# Patient Record
Sex: FEMALE | Race: WHITE | Employment: UNEMPLOYED
[De-identification: names, ages, dates, MRNs, and addresses within clinical notes are randomized per-mention and may not be internally consistent; named-entity substitution may affect disease eponyms.]

---

## 2018-03-22 ENCOUNTER — TELEPHONE (OUTPATIENT)
Dept: CASE MANAGEMENT | Age: 58
End: 2018-03-22

## 2018-03-22 NOTE — TELEPHONE ENCOUNTER
A lung screening packet was mailed to Claudette uLna on 3/22/2018. This packet includes: an appointment reminder for her CT Lung Screening scheduled for 3/27/18 , a patient questionnaire,a lung screening brochure and the [facility] Lung Screening Patient Education Sheet.       KATELIN Monae, RMarquisTMarquis(R)(T)  Patient [address] [facility]    [phone]

## 2018-03-27 ENCOUNTER — HOSPITAL ENCOUNTER (OUTPATIENT)
Dept: CT IMAGING | Age: 58
Discharge: HOME | End: 2018-03-29
Payer: OTHER GOVERNMENT

## 2018-03-27 DIAGNOSIS — Z87.891 PERSONAL HISTORY OF TOBACCO USE: ICD-10-CM

## 2018-03-27 PROCEDURE — G0297 LDCT FOR LUNG CA SCREEN: HCPCS

## 2018-03-28 ENCOUNTER — TELEPHONE (OUTPATIENT)
Dept: CASE MANAGEMENT | Age: 58
End: 2018-03-28

## 2018-03-28 NOTE — TELEPHONE ENCOUNTER
No call, encounter opened to process CT Lung Screening. CT Lung Screen 3/27/18 :  Impression   1. No focal consolidation, pleural effusion or pneumothorax. 2. No definite pleural or parenchymal mass lesion. 3. Minimal pulmonary emphysematous disease. 4. Enlarged precarinal lymph node of uncertain etiology and clinical   significance. Recommend clinical correlation. 5. Ectasia of ascending aorta, measuring 3.8 cm in maximum diameter. 6. Small to moderate size hiatal hernia.       Lung - RADS Category 1 :  Negative -  No nodules and definitely benign   nodules - Continue annual lung CT screening in 12 months. Dinorah Dumas is a  66 y.o. female who is a former (11 years) smoker with a 30 pack year smoking history. Letter mailed to patient  3/28/2018 encouraging her  to call her physician for results and follow up recommendations if needed.             AURELIO Tian., R.T.(R)(T)  Patient [address] [facility]    [phone]

## 2018-03-28 NOTE — LETTER
Medical Imaging Services      3/28/2018        [place] [address]            Dear Roger Morgan,       RE: Your recent CT Lung Screen                      We are pleased to inform you that your exam showed no signs of lung cancer. We recommend that your next lung screening exam be in 12 months. Here are some other important points you should know:      Your full low-dose Chest CT report, including any minor observations, has been sent to your health care provider. Your exam report and images will be kept on file at [facility]) as part of your permanent record and are available for your continuing care.  Although low-dose chest CT is very effective at finding lung cancer early, it cannot find all lung cancers. If you develop any new symptoms such as shortness of breath, chest pain, or coughing up blood, please call your doctor.  Please keep in mind that good health involves quitting smoking (for help, call [address] at [phone]), an annual physical exam, and continued screening with low-dose chest CT. If you have any questions about this letter or have difficulty in contacting your health care provider please call our patient navigator, Racheal Gracia at [phone].           Sincerely, The [facility] Lung Screening Program

## 2018-04-03 DIAGNOSIS — I77.89 ASCENDING AORTA ENLARGEMENT (HCC): Primary | ICD-10-CM

## 2018-04-17 ENCOUNTER — OFFICE VISIT (OUTPATIENT)
Dept: CARDIOTHORACIC SURGERY | Age: 58
End: 2018-04-17
Payer: OTHER GOVERNMENT

## 2018-04-17 VITALS
DIASTOLIC BLOOD PRESSURE: 88 MMHG | HEIGHT: 62 IN | SYSTOLIC BLOOD PRESSURE: 129 MMHG | BODY MASS INDEX: 29.81 KG/M2 | HEART RATE: 86 BPM | WEIGHT: 162 LBS

## 2018-04-17 DIAGNOSIS — R59.0 MEDIASTINAL LYMPHADENOPATHY: Primary | ICD-10-CM

## 2018-04-17 DIAGNOSIS — K44.9 HIATAL HERNIA: ICD-10-CM

## 2018-04-17 DIAGNOSIS — I77.810 ASCENDING AORTA DILATION (HCC): ICD-10-CM

## 2018-04-17 PROCEDURE — 99204 OFFICE O/P NEW MOD 45 MIN: CPT | Performed by: THORACIC SURGERY (CARDIOTHORACIC VASCULAR SURGERY)

## 2018-04-17 ASSESSMENT — ENCOUNTER SYMPTOMS
SHORTNESS OF BREATH: 0
COUGH: 0

## 2018-04-18 ENCOUNTER — OFFICE VISIT (OUTPATIENT)
Dept: FAMILY MEDICINE CLINIC | Age: 58
End: 2018-04-18
Payer: OTHER GOVERNMENT

## 2018-04-18 VITALS
RESPIRATION RATE: 18 BRPM | WEIGHT: 167 LBS | OXYGEN SATURATION: 97 % | SYSTOLIC BLOOD PRESSURE: 138 MMHG | HEIGHT: 63 IN | TEMPERATURE: 98 F | BODY MASS INDEX: 29.59 KG/M2 | DIASTOLIC BLOOD PRESSURE: 90 MMHG | HEART RATE: 92 BPM

## 2018-04-18 DIAGNOSIS — R59.0 MEDIASTINAL LYMPHADENOPATHY: ICD-10-CM

## 2018-04-18 DIAGNOSIS — I71.20 THORACIC AORTIC ANEURYSM WITHOUT RUPTURE: Primary | ICD-10-CM

## 2018-04-18 DIAGNOSIS — R59.9 LYMPH NODE ENLARGEMENT: ICD-10-CM

## 2018-04-18 DIAGNOSIS — K44.9 HERNIA, HIATAL: ICD-10-CM

## 2018-04-18 DIAGNOSIS — K44.9 HIATAL HERNIA: Primary | ICD-10-CM

## 2018-04-18 PROCEDURE — 99213 OFFICE O/P EST LOW 20 MIN: CPT | Performed by: FAMILY MEDICINE

## 2018-04-18 PROCEDURE — 99999 PR DOPPLER ECHO HEART,COMPLETE: CPT | Performed by: THORACIC SURGERY (CARDIOTHORACIC VASCULAR SURGERY)

## 2018-04-18 RX ORDER — RANITIDINE 150 MG/1
150 TABLET ORAL 2 TIMES DAILY
Qty: 60 TABLET | Refills: 3 | Status: SHIPPED | OUTPATIENT
Start: 2018-04-18 | End: 2018-05-25

## 2018-04-25 ENCOUNTER — TELEPHONE (OUTPATIENT)
Dept: CARDIOTHORACIC SURGERY | Age: 58
End: 2018-04-25

## 2018-05-11 ENCOUNTER — PREP FOR PROCEDURE (OUTPATIENT)
Dept: SURGERY | Age: 58
End: 2018-05-11

## 2018-05-11 RX ORDER — SODIUM CHLORIDE 9 MG/ML
INJECTION, SOLUTION INTRAVENOUS CONTINUOUS
Status: CANCELLED | OUTPATIENT
Start: 2018-05-11

## 2018-05-15 ENCOUNTER — TELEPHONE (OUTPATIENT)
Dept: CARDIOTHORACIC SURGERY | Age: 58
End: 2018-05-15

## 2018-05-15 ENCOUNTER — HOSPITAL ENCOUNTER (OUTPATIENT)
Dept: NON INVASIVE DIAGNOSTICS | Age: 58
Discharge: HOME | End: 2018-05-15
Payer: OTHER GOVERNMENT

## 2018-05-15 LAB
LV EF: 60 %
LVEF MODALITY: NORMAL

## 2018-05-15 PROCEDURE — 93306 TTE W/DOPPLER COMPLETE: CPT

## 2018-05-16 ENCOUNTER — TELEPHONE (OUTPATIENT)
Dept: FAMILY MEDICINE CLINIC | Age: 58
End: 2018-05-16

## 2018-05-25 RX ORDER — RANITIDINE 150 MG/1
150 TABLET ORAL 2 TIMES DAILY
COMMUNITY
End: 2018-11-29 | Stop reason: ALTCHOICE

## 2018-05-25 RX ORDER — FLUTICASONE PROPIONATE 50 MCG
1 SPRAY, SUSPENSION (ML) NASAL DAILY
COMMUNITY
End: 2019-03-26 | Stop reason: SDUPTHER

## 2018-05-30 ENCOUNTER — HOSPITAL ENCOUNTER (OUTPATIENT)
Dept: GENERAL RADIOLOGY | Age: 58
Discharge: HOME | End: 2018-06-01
Payer: OTHER GOVERNMENT

## 2018-05-30 DIAGNOSIS — K44.0 DIAPHRAGMATIC HERNIA WITH OBSTRUCTION, WITHOUT GANGRENE: ICD-10-CM

## 2018-05-30 PROCEDURE — A4641 RADIOPHARM DX AGENT NOC: HCPCS | Performed by: RADIOLOGY

## 2018-05-30 PROCEDURE — 74240 X-RAY XM UPR GI TRC 1CNTRST: CPT

## 2018-05-30 PROCEDURE — 6360000004 HC RX CONTRAST MEDICATION: Performed by: RADIOLOGY

## 2018-05-30 PROCEDURE — 2500000003 HC RX 250 WO HCPCS: Performed by: RADIOLOGY

## 2018-05-30 PROCEDURE — 6370000000 HC RX 637 (ALT 250 FOR IP): Performed by: RADIOLOGY

## 2018-05-30 RX ADMIN — ANTACID/ANTIFLATULENT 1 EACH: 380; 550; 10; 10 GRANULE, EFFERVESCENT ORAL at 08:56

## 2018-05-30 RX ADMIN — BARIUM SULFATE 1 TABLET: 700 TABLET ORAL at 08:55

## 2018-05-30 RX ADMIN — BARIUM SULFATE 176 ML: 960 POWDER, FOR SUSPENSION ORAL at 08:56

## 2018-05-30 RX ADMIN — BARIUM SULFATE 140 ML: 980 POWDER, FOR SUSPENSION ORAL at 08:56

## 2018-06-11 ENCOUNTER — ANESTHESIA (OUTPATIENT)
Dept: ENDOSCOPY | Age: 58
End: 2018-06-11
Payer: OTHER GOVERNMENT

## 2018-06-11 ENCOUNTER — HOSPITAL ENCOUNTER (OUTPATIENT)
Age: 58
Setting detail: OUTPATIENT SURGERY
Discharge: HOME | End: 2018-06-11
Attending: SURGERY | Admitting: SURGERY
Payer: OTHER GOVERNMENT

## 2018-06-11 ENCOUNTER — ANESTHESIA EVENT (OUTPATIENT)
Dept: ENDOSCOPY | Age: 58
End: 2018-06-11
Payer: OTHER GOVERNMENT

## 2018-06-11 VITALS
WEIGHT: 162 LBS | BODY MASS INDEX: 28.7 KG/M2 | OXYGEN SATURATION: 99 % | TEMPERATURE: 97.5 F | SYSTOLIC BLOOD PRESSURE: 145 MMHG | DIASTOLIC BLOOD PRESSURE: 92 MMHG | HEIGHT: 63 IN | HEART RATE: 70 BPM | RESPIRATION RATE: 16 BRPM

## 2018-06-11 VITALS — DIASTOLIC BLOOD PRESSURE: 89 MMHG | OXYGEN SATURATION: 98 % | SYSTOLIC BLOOD PRESSURE: 131 MMHG

## 2018-06-11 PROCEDURE — 6360000002 HC RX W HCPCS: Performed by: NURSE ANESTHETIST, CERTIFIED REGISTERED

## 2018-06-11 PROCEDURE — 7100000011 HC PHASE II RECOVERY - ADDTL 15 MIN: Performed by: SURGERY

## 2018-06-11 PROCEDURE — 2580000003 HC RX 258: Performed by: SURGERY

## 2018-06-11 PROCEDURE — 7100000010 HC PHASE II RECOVERY - FIRST 15 MIN: Performed by: SURGERY

## 2018-06-11 PROCEDURE — 88305 TISSUE EXAM BY PATHOLOGIST: CPT

## 2018-06-11 PROCEDURE — 3700000000 HC ANESTHESIA ATTENDED CARE: Performed by: SURGERY

## 2018-06-11 PROCEDURE — 3609012400 HC EGD TRANSORAL BIOPSY SINGLE/MULTIPLE: Performed by: SURGERY

## 2018-06-11 RX ORDER — SODIUM CHLORIDE 9 MG/ML
INJECTION, SOLUTION INTRAVENOUS CONTINUOUS
Status: DISCONTINUED | OUTPATIENT
Start: 2018-06-11 | End: 2018-06-11 | Stop reason: HOSPADM

## 2018-06-11 RX ORDER — PROPOFOL 10 MG/ML
INJECTION, EMULSION INTRAVENOUS PRN
Status: DISCONTINUED | OUTPATIENT
Start: 2018-06-11 | End: 2018-06-11 | Stop reason: SDUPTHER

## 2018-06-11 RX ADMIN — SODIUM CHLORIDE: 9 INJECTION, SOLUTION INTRAVENOUS at 09:00

## 2018-06-11 RX ADMIN — PROPOFOL 130 MG: 10 INJECTION, EMULSION INTRAVENOUS at 09:08

## 2018-06-11 ASSESSMENT — PAIN DESCRIPTION - PAIN TYPE
TYPE: SURGICAL PAIN
TYPE: SURGICAL PAIN

## 2018-06-11 ASSESSMENT — PAIN SCALES - GENERAL
PAINLEVEL_OUTOF10: 0
PAINLEVEL_OUTOF10: 0

## 2018-06-11 ASSESSMENT — PAIN - FUNCTIONAL ASSESSMENT: PAIN_FUNCTIONAL_ASSESSMENT: 0-10

## 2018-09-05 ENCOUNTER — HOSPITAL ENCOUNTER (OUTPATIENT)
Age: 58
Discharge: HOME | End: 2018-09-07
Payer: OTHER GOVERNMENT

## 2018-09-05 ENCOUNTER — NURSE ONLY (OUTPATIENT)
Dept: FAMILY MEDICINE CLINIC | Age: 58
End: 2018-09-05
Payer: OTHER GOVERNMENT

## 2018-09-05 DIAGNOSIS — E78.5 HYPERLIPIDEMIA, UNSPECIFIED HYPERLIPIDEMIA TYPE: ICD-10-CM

## 2018-09-05 LAB
ALBUMIN SERPL-MCNC: 4.4 G/DL (ref 3.5–5.2)
ALP BLD-CCNC: 37 U/L (ref 35–104)
ALT SERPL-CCNC: 16 U/L (ref 0–32)
ANION GAP SERPL CALCULATED.3IONS-SCNC: 15 MMOL/L (ref 7–16)
AST SERPL-CCNC: 28 U/L (ref 0–31)
BILIRUB SERPL-MCNC: <0.2 MG/DL (ref 0–1.2)
BUN BLDV-MCNC: 17 MG/DL (ref 6–20)
CALCIUM SERPL-MCNC: 9 MG/DL (ref 8.6–10.2)
CHLORIDE BLD-SCNC: 102 MMOL/L (ref 98–107)
CHOLESTEROL, TOTAL: 253 MG/DL (ref 0–199)
CO2: 25 MMOL/L (ref 22–29)
CREAT SERPL-MCNC: 0.7 MG/DL (ref 0.5–1)
GFR AFRICAN AMERICAN: >60
GFR NON-AFRICAN AMERICAN: >60 ML/MIN/1.73
GLUCOSE BLD-MCNC: 99 MG/DL (ref 74–109)
HDLC SERPL-MCNC: 79 MG/DL
LDL CHOLESTEROL CALCULATED: 160 MG/DL (ref 0–99)
POTASSIUM SERPL-SCNC: 4.9 MMOL/L (ref 3.5–5)
SODIUM BLD-SCNC: 142 MMOL/L (ref 132–146)
TOTAL PROTEIN: 6.9 G/DL (ref 6.4–8.3)
TRIGL SERPL-MCNC: 68 MG/DL (ref 0–149)
VLDLC SERPL CALC-MCNC: 14 MG/DL

## 2018-09-05 PROCEDURE — 36415 COLL VENOUS BLD VENIPUNCTURE: CPT | Performed by: FAMILY MEDICINE

## 2018-09-05 PROCEDURE — 80061 LIPID PANEL: CPT

## 2018-09-05 PROCEDURE — 80053 COMPREHEN METABOLIC PANEL: CPT

## 2018-09-11 ENCOUNTER — OFFICE VISIT (OUTPATIENT)
Dept: FAMILY MEDICINE CLINIC | Age: 58
End: 2018-09-11
Payer: OTHER GOVERNMENT

## 2018-09-11 VITALS
BODY MASS INDEX: 28.03 KG/M2 | RESPIRATION RATE: 16 BRPM | SYSTOLIC BLOOD PRESSURE: 129 MMHG | HEIGHT: 63 IN | DIASTOLIC BLOOD PRESSURE: 81 MMHG | TEMPERATURE: 98.4 F | OXYGEN SATURATION: 99 % | WEIGHT: 158.2 LBS | HEART RATE: 90 BPM

## 2018-09-11 DIAGNOSIS — E66.3 PATIENT OVERWEIGHT: ICD-10-CM

## 2018-09-11 DIAGNOSIS — E55.9 VITAMIN D DEFICIENCY: ICD-10-CM

## 2018-09-11 DIAGNOSIS — E78.5 HYPERLIPIDEMIA, UNSPECIFIED HYPERLIPIDEMIA TYPE: Primary | ICD-10-CM

## 2018-09-11 DIAGNOSIS — J34.89 SINUS DRAINAGE: ICD-10-CM

## 2018-09-11 DIAGNOSIS — K44.9 HIATAL HERNIA: ICD-10-CM

## 2018-09-11 PROCEDURE — 99214 OFFICE O/P EST MOD 30 MIN: CPT | Performed by: FAMILY MEDICINE

## 2018-09-11 RX ORDER — OMEPRAZOLE 40 MG/1
40 CAPSULE, DELAYED RELEASE ORAL DAILY
COMMUNITY
End: 2018-11-29 | Stop reason: ALTCHOICE

## 2018-09-11 ASSESSMENT — PATIENT HEALTH QUESTIONNAIRE - PHQ9
1. LITTLE INTEREST OR PLEASURE IN DOING THINGS: 0
SUM OF ALL RESPONSES TO PHQ9 QUESTIONS 1 & 2: 0
2. FEELING DOWN, DEPRESSED OR HOPELESS: 0
SUM OF ALL RESPONSES TO PHQ QUESTIONS 1-9: 0
SUM OF ALL RESPONSES TO PHQ QUESTIONS 1-9: 0

## 2018-09-11 NOTE — PROGRESS NOTES
BP: 129/81   Pulse: 90   Resp: 16   Temp: 98.4 °F (36.9 °C)   TempSrc: Oral   SpO2: 99%   Weight: 158 lb 3.2 oz (71.8 kg)   Height: 5' 2.5\" (1.588 m)       General:  Patient alert and oriented x 3, NAD, pleasant  HEENT:  Atraumatic, normocephalic, PERRLA, EOMI, clear conjunctiva, TMs clear, nose-clear, throat - no erythema  Neck:  Supple, no goiter, no carotid bruits, no LAD  Lungs:  CTA B  Heart:  RRR, no murmurs, gallops or rubs  Abdomen:  Soft/nt/nd, + bowel sounds  Extremities:  No clubbing, cyanosis or edema  Skin: unremarkable    Assessment/Plan:      Megan Hernández was seen today for hyperlipidemia and discuss labs. Diagnoses and all orders for this visit:    Hyperlipidemia, unspecified hyperlipidemia type  -     Comprehensive Metabolic Panel; Future  -     CBC Auto Differential; Future  -     Lipid Panel; Future  -     TSH without Reflex; Future    Patient overweight    Sinus drainage    Hiatal hernia    Vitamin D deficiency  -     Vitamin D 25 Hydrox, D2 & D3; Future      As above. Call or go to ED immediately if symptoms worsen or persist.  Return in about 6 months (around 3/11/2019). , or sooner if necessary. Educational materials and/or home exercises printed for patient's review and were included in patient instructions on his/her After Visit Summary and given to patient at the end of visit. Counseled regarding above diagnosis, including possible risks and complications,  especially if left uncontrolled. Counseled regarding the possible side effects, risks, benefits and alternatives to treatment; patient and/or guardian verbalizes understanding, agrees, feels comfortable with and wishes to proceed with above treatment plan. Advised patient to call with any new medication issues, and read all Rx info from pharmacy to assure aware of all possible risks and side effects of medication before taking. Reviewed age and gender appropriate health screening exams and vaccinations.   Advised patient

## 2018-10-16 ENCOUNTER — NURSE ONLY (OUTPATIENT)
Dept: FAMILY MEDICINE CLINIC | Age: 58
End: 2018-10-16
Payer: OTHER GOVERNMENT

## 2018-10-16 DIAGNOSIS — Z23 NEED FOR INFLUENZA VACCINATION: Primary | ICD-10-CM

## 2018-10-16 PROCEDURE — 90686 IIV4 VACC NO PRSV 0.5 ML IM: CPT | Performed by: FAMILY MEDICINE

## 2018-10-16 PROCEDURE — 90471 IMMUNIZATION ADMIN: CPT | Performed by: FAMILY MEDICINE

## 2018-11-01 ENCOUNTER — HOSPITAL ENCOUNTER (OUTPATIENT)
Dept: CT IMAGING | Age: 58
Discharge: HOME | End: 2018-11-03
Payer: OTHER GOVERNMENT

## 2018-11-01 DIAGNOSIS — R59.0 MEDIASTINAL ADENOPATHY: ICD-10-CM

## 2018-11-01 PROCEDURE — 6360000004 HC RX CONTRAST MEDICATION: Performed by: RADIOLOGY

## 2018-11-01 PROCEDURE — 71260 CT THORAX DX C+: CPT

## 2018-11-01 RX ADMIN — IOPAMIDOL 80 ML: 755 INJECTION, SOLUTION INTRAVENOUS at 13:51

## 2019-03-15 ENCOUNTER — TELEPHONE (OUTPATIENT)
Dept: CASE MANAGEMENT | Age: 59
End: 2019-03-15

## 2019-03-21 ENCOUNTER — NURSE ONLY (OUTPATIENT)
Dept: FAMILY MEDICINE CLINIC | Age: 59
End: 2019-03-21
Payer: OTHER GOVERNMENT

## 2019-03-21 ENCOUNTER — HOSPITAL ENCOUNTER (OUTPATIENT)
Age: 59
Discharge: HOME | End: 2019-03-23
Payer: OTHER GOVERNMENT

## 2019-03-21 DIAGNOSIS — E78.5 HYPERLIPIDEMIA, UNSPECIFIED HYPERLIPIDEMIA TYPE: ICD-10-CM

## 2019-03-21 DIAGNOSIS — E55.9 VITAMIN D DEFICIENCY: ICD-10-CM

## 2019-03-21 PROCEDURE — 85025 COMPLETE CBC W/AUTO DIFF WBC: CPT

## 2019-03-21 PROCEDURE — 36415 COLL VENOUS BLD VENIPUNCTURE: CPT | Performed by: FAMILY MEDICINE

## 2019-03-21 PROCEDURE — 80061 LIPID PANEL: CPT

## 2019-03-21 PROCEDURE — 82306 VITAMIN D 25 HYDROXY: CPT

## 2019-03-21 PROCEDURE — 80053 COMPREHEN METABOLIC PANEL: CPT

## 2019-03-21 PROCEDURE — 84443 ASSAY THYROID STIM HORMONE: CPT

## 2019-03-22 LAB
ALBUMIN SERPL-MCNC: 4.6 G/DL (ref 3.5–5.2)
ALP BLD-CCNC: 81 U/L (ref 35–104)
ALT SERPL-CCNC: 16 U/L (ref 0–32)
ANION GAP SERPL CALCULATED.3IONS-SCNC: 14 MMOL/L (ref 7–16)
AST SERPL-CCNC: 24 U/L (ref 0–31)
BASOPHILS ABSOLUTE: 0.04 E9/L (ref 0–0.2)
BASOPHILS RELATIVE PERCENT: 0.9 % (ref 0–2)
BILIRUB SERPL-MCNC: 0.3 MG/DL (ref 0–1.2)
BUN BLDV-MCNC: 16 MG/DL (ref 6–20)
CALCIUM SERPL-MCNC: 9.5 MG/DL (ref 8.6–10.2)
CHLORIDE BLD-SCNC: 101 MMOL/L (ref 98–107)
CHOLESTEROL, TOTAL: 240 MG/DL (ref 0–199)
CO2: 26 MMOL/L (ref 22–29)
CREAT SERPL-MCNC: 0.8 MG/DL (ref 0.5–1)
EOSINOPHILS ABSOLUTE: 0.07 E9/L (ref 0.05–0.5)
EOSINOPHILS RELATIVE PERCENT: 1.5 % (ref 0–6)
GFR AFRICAN AMERICAN: >60
GFR NON-AFRICAN AMERICAN: >60 ML/MIN/1.73
GLUCOSE BLD-MCNC: 86 MG/DL (ref 74–99)
HCT VFR BLD CALC: 45.6 % (ref 34–48)
HDLC SERPL-MCNC: 88 MG/DL
HEMOGLOBIN: 14.4 G/DL (ref 11.5–15.5)
IMMATURE GRANULOCYTES #: 0 E9/L
IMMATURE GRANULOCYTES %: 0 % (ref 0–5)
LDL CHOLESTEROL CALCULATED: 139 MG/DL (ref 0–99)
LYMPHOCYTES ABSOLUTE: 2.52 E9/L (ref 1.5–4)
LYMPHOCYTES RELATIVE PERCENT: 55.3 % (ref 20–42)
MCH RBC QN AUTO: 30.8 PG (ref 26–35)
MCHC RBC AUTO-ENTMCNC: 31.6 % (ref 32–34.5)
MCV RBC AUTO: 97.6 FL (ref 80–99.9)
MONOCYTES ABSOLUTE: 0.29 E9/L (ref 0.1–0.95)
MONOCYTES RELATIVE PERCENT: 6.4 % (ref 2–12)
NEUTROPHILS ABSOLUTE: 1.64 E9/L (ref 1.8–7.3)
NEUTROPHILS RELATIVE PERCENT: 35.9 % (ref 43–80)
PDW BLD-RTO: 12.6 FL (ref 11.5–15)
PLATELET # BLD: 256 E9/L (ref 130–450)
PMV BLD AUTO: 11.1 FL (ref 7–12)
POTASSIUM SERPL-SCNC: 5.1 MMOL/L (ref 3.5–5)
RBC # BLD: 4.67 E12/L (ref 3.5–5.5)
SODIUM BLD-SCNC: 141 MMOL/L (ref 132–146)
TOTAL PROTEIN: 7.1 G/DL (ref 6.4–8.3)
TRIGL SERPL-MCNC: 66 MG/DL (ref 0–149)
TSH SERPL DL<=0.05 MIU/L-ACNC: 3.54 UIU/ML (ref 0.27–4.2)
VITAMIN D 25-HYDROXY: 41 NG/ML (ref 30–100)
VLDLC SERPL CALC-MCNC: 13 MG/DL
WBC # BLD: 4.6 E9/L (ref 4.5–11.5)

## 2019-03-26 ENCOUNTER — OFFICE VISIT (OUTPATIENT)
Dept: FAMILY MEDICINE CLINIC | Age: 59
End: 2019-03-26
Payer: OTHER GOVERNMENT

## 2019-03-26 VITALS
SYSTOLIC BLOOD PRESSURE: 128 MMHG | WEIGHT: 150.8 LBS | HEIGHT: 63 IN | BODY MASS INDEX: 26.72 KG/M2 | RESPIRATION RATE: 18 BRPM | OXYGEN SATURATION: 100 % | TEMPERATURE: 99.1 F | DIASTOLIC BLOOD PRESSURE: 87 MMHG | HEART RATE: 84 BPM

## 2019-03-26 DIAGNOSIS — E78.5 HYPERLIPIDEMIA, UNSPECIFIED HYPERLIPIDEMIA TYPE: ICD-10-CM

## 2019-03-26 DIAGNOSIS — Z23 NEED FOR SHINGLES VACCINE: ICD-10-CM

## 2019-03-26 DIAGNOSIS — Z00.00 ANNUAL PHYSICAL EXAM: Primary | ICD-10-CM

## 2019-03-26 PROCEDURE — 99396 PREV VISIT EST AGE 40-64: CPT | Performed by: FAMILY MEDICINE

## 2019-03-26 PROCEDURE — 90471 IMMUNIZATION ADMIN: CPT | Performed by: FAMILY MEDICINE

## 2019-03-26 PROCEDURE — 90750 HZV VACC RECOMBINANT IM: CPT | Performed by: FAMILY MEDICINE

## 2019-03-26 RX ORDER — FLUTICASONE PROPIONATE 50 MCG
1 SPRAY, SUSPENSION (ML) NASAL DAILY
Qty: 3 BOTTLE | Refills: 3 | Status: ON HOLD
Start: 2019-03-26 | End: 2021-03-29 | Stop reason: HOSPADM

## 2019-03-26 RX ORDER — MONTELUKAST SODIUM 10 MG/1
10 TABLET ORAL DAILY
Qty: 30 TABLET | Refills: 3 | Status: SHIPPED | OUTPATIENT
Start: 2019-03-26 | End: 2019-04-08 | Stop reason: SDUPTHER

## 2019-03-26 ASSESSMENT — PATIENT HEALTH QUESTIONNAIRE - PHQ9
2. FEELING DOWN, DEPRESSED OR HOPELESS: 0
SUM OF ALL RESPONSES TO PHQ QUESTIONS 1-9: 0
1. LITTLE INTEREST OR PLEASURE IN DOING THINGS: 0
SUM OF ALL RESPONSES TO PHQ QUESTIONS 1-9: 0
SUM OF ALL RESPONSES TO PHQ9 QUESTIONS 1 & 2: 0

## 2019-04-26 DIAGNOSIS — I71.20 THORACIC AORTIC ANEURYSM WITHOUT RUPTURE: Primary | ICD-10-CM

## 2019-06-12 ENCOUNTER — NURSE ONLY (OUTPATIENT)
Dept: FAMILY MEDICINE CLINIC | Age: 59
End: 2019-06-12
Payer: OTHER GOVERNMENT

## 2019-06-12 DIAGNOSIS — Z23 NEED FOR SHINGLES VACCINE: Primary | ICD-10-CM

## 2019-06-12 PROCEDURE — 90750 HZV VACC RECOMBINANT IM: CPT | Performed by: FAMILY MEDICINE

## 2019-06-12 PROCEDURE — 90471 IMMUNIZATION ADMIN: CPT | Performed by: FAMILY MEDICINE

## 2019-07-25 ENCOUNTER — HOSPITAL ENCOUNTER (OUTPATIENT)
Dept: CT IMAGING | Age: 59
Discharge: HOME | End: 2019-07-27
Payer: OTHER GOVERNMENT

## 2019-07-25 DIAGNOSIS — I71.20 THORACIC AORTIC ANEURYSM WITHOUT RUPTURE: ICD-10-CM

## 2019-07-25 PROCEDURE — 71260 CT THORAX DX C+: CPT

## 2019-07-25 PROCEDURE — 6360000004 HC RX CONTRAST MEDICATION: Performed by: RADIOLOGY

## 2019-07-25 RX ADMIN — IOPAMIDOL 90 ML: 755 INJECTION, SOLUTION INTRAVENOUS at 08:21

## 2019-07-30 ENCOUNTER — OFFICE VISIT (OUTPATIENT)
Dept: CARDIOTHORACIC SURGERY | Age: 59
End: 2019-07-30
Payer: OTHER GOVERNMENT

## 2019-07-30 VITALS
HEIGHT: 62 IN | BODY MASS INDEX: 28.34 KG/M2 | HEART RATE: 80 BPM | WEIGHT: 154 LBS | SYSTOLIC BLOOD PRESSURE: 131 MMHG | DIASTOLIC BLOOD PRESSURE: 86 MMHG

## 2019-07-30 DIAGNOSIS — Z09 FOLLOW UP: Primary | ICD-10-CM

## 2019-07-30 PROCEDURE — 99213 OFFICE O/P EST LOW 20 MIN: CPT | Performed by: THORACIC SURGERY (CARDIOTHORACIC VASCULAR SURGERY)

## 2019-07-30 NOTE — PROGRESS NOTES
Subjective:      Patient ID: Dequan Samaniego is a 64 y.o. female. Chief Complaint   Patient presents with    Follow-up     1 yr f/u thoracic aneurysm       HPI  Ms. Tova Arredondo is a 79-year-old female whom presents to the office for surveillance of a possible ascending aortic aneurysm. Recent CT of the chest reveals a stable aortic aneurysm  is not aneurysmal. She denies any SOB, CP, back pain, or any major complaints. She is not hypertensive, and is a former smoker. Review of Systems   Constitutional: Negative for fever and chills. Respiratory: Negative for cough, chest tightness and shortness of breath. Cardiovascular: Negative for chest pain, palpitations and leg swelling. Musculoskeletal: Negative for back pain. Neurological: Negative for dizziness, syncope and light-headedness. Objective:   Physical Exam  Constitutional: oriented to person, place, and time. No distress. Cardiovascular: Normal rate. Pulmonary/Chest: Effort normal and breath sounds normal. No respiratory distress. Abdominal: Soft. Bowel sounds are normal.   Musculoskeletal: Normal range of motion. Neurological: alert and oriented to person, place, and time. Skin: Skin is warm and dry. Psychiatric: normal mood and affect. Assessment:      3.4 cm Ascending aorta      Plan: This CT scan is more appropriate to evaluate her ascending aorta and it is not aneurysmal.  Her echo shows a trileaflet valve. I do not feel there is any reason to continue to do CT scans on her from an aorta standpoint. Follow up PRN.         Olga Garcia MD

## 2019-09-19 ENCOUNTER — HOSPITAL ENCOUNTER (OUTPATIENT)
Age: 59
Discharge: HOME | End: 2019-09-21
Payer: OTHER GOVERNMENT

## 2019-09-19 ENCOUNTER — NURSE ONLY (OUTPATIENT)
Dept: FAMILY MEDICINE CLINIC | Age: 59
End: 2019-09-19
Payer: OTHER GOVERNMENT

## 2019-09-19 DIAGNOSIS — E78.5 HYPERLIPIDEMIA, UNSPECIFIED HYPERLIPIDEMIA TYPE: ICD-10-CM

## 2019-09-19 PROCEDURE — 80061 LIPID PANEL: CPT

## 2019-09-19 PROCEDURE — 36415 COLL VENOUS BLD VENIPUNCTURE: CPT | Performed by: FAMILY MEDICINE

## 2019-09-19 PROCEDURE — 80053 COMPREHEN METABOLIC PANEL: CPT

## 2019-09-19 PROCEDURE — 85025 COMPLETE CBC W/AUTO DIFF WBC: CPT

## 2019-09-20 LAB
ALBUMIN SERPL-MCNC: 4.4 G/DL (ref 3.5–5.2)
ALP BLD-CCNC: 68 U/L (ref 35–104)
ALT SERPL-CCNC: 14 U/L (ref 0–32)
ANION GAP SERPL CALCULATED.3IONS-SCNC: 15 MMOL/L (ref 7–16)
AST SERPL-CCNC: 22 U/L (ref 0–31)
BASOPHILS ABSOLUTE: 0.03 E9/L (ref 0–0.2)
BASOPHILS RELATIVE PERCENT: 0.6 % (ref 0–2)
BILIRUB SERPL-MCNC: 0.3 MG/DL (ref 0–1.2)
BUN BLDV-MCNC: 10 MG/DL (ref 6–20)
CALCIUM SERPL-MCNC: 9.5 MG/DL (ref 8.6–10.2)
CHLORIDE BLD-SCNC: 103 MMOL/L (ref 98–107)
CHOLESTEROL, TOTAL: 262 MG/DL (ref 0–199)
CO2: 25 MMOL/L (ref 22–29)
CREAT SERPL-MCNC: 0.8 MG/DL (ref 0.5–1)
EOSINOPHILS ABSOLUTE: 0.07 E9/L (ref 0.05–0.5)
EOSINOPHILS RELATIVE PERCENT: 1.5 % (ref 0–6)
GFR AFRICAN AMERICAN: >60
GFR NON-AFRICAN AMERICAN: >60 ML/MIN/1.73
GLUCOSE BLD-MCNC: 84 MG/DL (ref 74–99)
HCT VFR BLD CALC: 46.9 % (ref 34–48)
HDLC SERPL-MCNC: 98 MG/DL
HEMOGLOBIN: 14.7 G/DL (ref 11.5–15.5)
IMMATURE GRANULOCYTES #: 0.01 E9/L
IMMATURE GRANULOCYTES %: 0.2 % (ref 0–5)
LDL CHOLESTEROL CALCULATED: 155 MG/DL (ref 0–99)
LYMPHOCYTES ABSOLUTE: 2.8 E9/L (ref 1.5–4)
LYMPHOCYTES RELATIVE PERCENT: 60.5 % (ref 20–42)
MCH RBC QN AUTO: 30.6 PG (ref 26–35)
MCHC RBC AUTO-ENTMCNC: 31.3 % (ref 32–34.5)
MCV RBC AUTO: 97.7 FL (ref 80–99.9)
MONOCYTES ABSOLUTE: 0.35 E9/L (ref 0.1–0.95)
MONOCYTES RELATIVE PERCENT: 7.6 % (ref 2–12)
NEUTROPHILS ABSOLUTE: 1.37 E9/L (ref 1.8–7.3)
NEUTROPHILS RELATIVE PERCENT: 29.6 % (ref 43–80)
PDW BLD-RTO: 13 FL (ref 11.5–15)
PLATELET # BLD: 259 E9/L (ref 130–450)
PMV BLD AUTO: 10.9 FL (ref 7–12)
POTASSIUM SERPL-SCNC: 4.4 MMOL/L (ref 3.5–5)
RBC # BLD: 4.8 E12/L (ref 3.5–5.5)
SODIUM BLD-SCNC: 143 MMOL/L (ref 132–146)
TOTAL PROTEIN: 7.6 G/DL (ref 6.4–8.3)
TRIGL SERPL-MCNC: 45 MG/DL (ref 0–149)
VLDLC SERPL CALC-MCNC: 9 MG/DL
WBC # BLD: 4.6 E9/L (ref 4.5–11.5)

## 2019-10-29 ENCOUNTER — OFFICE VISIT (OUTPATIENT)
Dept: FAMILY MEDICINE CLINIC | Age: 59
End: 2019-10-29
Payer: OTHER GOVERNMENT

## 2019-10-29 VITALS
BODY MASS INDEX: 28.01 KG/M2 | DIASTOLIC BLOOD PRESSURE: 88 MMHG | RESPIRATION RATE: 16 BRPM | WEIGHT: 152.2 LBS | TEMPERATURE: 98.8 F | HEIGHT: 62 IN | SYSTOLIC BLOOD PRESSURE: 136 MMHG | OXYGEN SATURATION: 99 % | HEART RATE: 91 BPM

## 2019-10-29 DIAGNOSIS — E78.5 HYPERLIPIDEMIA, UNSPECIFIED HYPERLIPIDEMIA TYPE: Primary | ICD-10-CM

## 2019-10-29 DIAGNOSIS — E66.3 PATIENT OVERWEIGHT: ICD-10-CM

## 2019-10-29 DIAGNOSIS — Z23 NEED FOR INFLUENZA VACCINATION: ICD-10-CM

## 2019-10-29 PROCEDURE — 90686 IIV4 VACC NO PRSV 0.5 ML IM: CPT | Performed by: FAMILY MEDICINE

## 2019-10-29 PROCEDURE — 90471 IMMUNIZATION ADMIN: CPT | Performed by: FAMILY MEDICINE

## 2019-10-29 PROCEDURE — 99214 OFFICE O/P EST MOD 30 MIN: CPT | Performed by: FAMILY MEDICINE

## 2020-01-07 ENCOUNTER — PATIENT MESSAGE (OUTPATIENT)
Dept: FAMILY MEDICINE CLINIC | Age: 60
End: 2020-01-07

## 2020-01-07 RX ORDER — OMEPRAZOLE 40 MG/1
40 CAPSULE, DELAYED RELEASE ORAL DAILY
Qty: 90 CAPSULE | Refills: 3 | Status: SHIPPED
Start: 2020-01-07 | End: 2021-01-20

## 2020-01-07 NOTE — TELEPHONE ENCOUNTER
From: Rosa Mejía  To: Junior Dorian MD  Sent: 1/7/2020 11:42 AM EST  Subject: Prescription Question    Hello, I was trying to refill Omeprazole 40mg, it says it is unavailable for refill on mychart. If you are able to refill please do so with [facility]. Thank you for your time.   Rosa Mejía

## 2020-06-16 RX ORDER — MONTELUKAST SODIUM 10 MG/1
10 TABLET ORAL DAILY
Qty: 90 TABLET | Refills: 3 | Status: SHIPPED
Start: 2020-06-16 | End: 2021-03-22 | Stop reason: ALTCHOICE

## 2020-06-25 ENCOUNTER — HOSPITAL ENCOUNTER (OUTPATIENT)
Age: 60
Discharge: HOME | End: 2020-06-25
Payer: OTHER GOVERNMENT

## 2020-06-25 LAB
ALBUMIN SERPL-MCNC: 4.5 G/DL (ref 3.5–5.2)
ALP BLD-CCNC: 87 U/L (ref 35–104)
ALT SERPL-CCNC: 15 U/L (ref 0–32)
ANION GAP SERPL CALCULATED.3IONS-SCNC: 8 MMOL/L (ref 7–16)
AST SERPL-CCNC: 18 U/L (ref 0–31)
BILIRUB SERPL-MCNC: 0.4 MG/DL (ref 0–1.2)
BUN BLDV-MCNC: 14 MG/DL (ref 6–20)
CALCIUM SERPL-MCNC: 9 MG/DL (ref 8.6–10.2)
CHLORIDE BLD-SCNC: 105 MMOL/L (ref 98–107)
CHOLESTEROL, TOTAL: 219 MG/DL (ref 0–199)
CO2: 27 MMOL/L (ref 22–29)
CREAT SERPL-MCNC: 0.7 MG/DL (ref 0.5–1)
GFR AFRICAN AMERICAN: >60
GFR NON-AFRICAN AMERICAN: >60 ML/MIN/1.73
GLUCOSE BLD-MCNC: 96 MG/DL (ref 74–99)
HDLC SERPL-MCNC: 87 MG/DL
LDL CHOLESTEROL CALCULATED: 117 MG/DL (ref 0–99)
POTASSIUM SERPL-SCNC: 3.8 MMOL/L (ref 3.5–5)
SODIUM BLD-SCNC: 140 MMOL/L (ref 132–146)
TOTAL PROTEIN: 7.1 G/DL (ref 6.4–8.3)
TRIGL SERPL-MCNC: 74 MG/DL (ref 0–149)
VLDLC SERPL CALC-MCNC: 15 MG/DL

## 2020-06-25 PROCEDURE — 80053 COMPREHEN METABOLIC PANEL: CPT

## 2020-06-25 PROCEDURE — 80061 LIPID PANEL: CPT

## 2020-06-25 PROCEDURE — 36415 COLL VENOUS BLD VENIPUNCTURE: CPT

## 2020-06-30 ENCOUNTER — TELEMEDICINE (OUTPATIENT)
Dept: FAMILY MEDICINE CLINIC | Age: 60
End: 2020-06-30
Payer: OTHER GOVERNMENT

## 2020-06-30 PROCEDURE — 99214 OFFICE O/P EST MOD 30 MIN: CPT | Performed by: FAMILY MEDICINE

## 2020-06-30 SDOH — ECONOMIC STABILITY: TRANSPORTATION INSECURITY
IN THE PAST 12 MONTHS, HAS LACK OF TRANSPORTATION KEPT YOU FROM MEETINGS, WORK, OR FROM GETTING THINGS NEEDED FOR DAILY LIVING?: NO

## 2020-06-30 SDOH — ECONOMIC STABILITY: TRANSPORTATION INSECURITY
IN THE PAST 12 MONTHS, HAS THE LACK OF TRANSPORTATION KEPT YOU FROM MEDICAL APPOINTMENTS OR FROM GETTING MEDICATIONS?: NO

## 2020-06-30 SDOH — ECONOMIC STABILITY: FOOD INSECURITY: WITHIN THE PAST 12 MONTHS, THE FOOD YOU BOUGHT JUST DIDN'T LAST AND YOU DIDN'T HAVE MONEY TO GET MORE.: NEVER TRUE

## 2020-06-30 SDOH — ECONOMIC STABILITY: INCOME INSECURITY: HOW HARD IS IT FOR YOU TO PAY FOR THE VERY BASICS LIKE FOOD, HOUSING, MEDICAL CARE, AND HEATING?: NOT HARD AT ALL

## 2020-06-30 SDOH — ECONOMIC STABILITY: FOOD INSECURITY: WITHIN THE PAST 12 MONTHS, YOU WORRIED THAT YOUR FOOD WOULD RUN OUT BEFORE YOU GOT MONEY TO BUY MORE.: NEVER TRUE

## 2020-06-30 ASSESSMENT — PATIENT HEALTH QUESTIONNAIRE - PHQ9
1. LITTLE INTEREST OR PLEASURE IN DOING THINGS: 0
SUM OF ALL RESPONSES TO PHQ QUESTIONS 1-9: 0
2. FEELING DOWN, DEPRESSED OR HOPELESS: 0
SUM OF ALL RESPONSES TO PHQ9 QUESTIONS 1 & 2: 0
SUM OF ALL RESPONSES TO PHQ QUESTIONS 1-9: 0

## 2020-07-08 ENCOUNTER — OFFICE VISIT (OUTPATIENT)
Dept: ENT CLINIC | Age: 60
End: 2020-07-08
Payer: OTHER GOVERNMENT

## 2020-07-08 VITALS — BODY MASS INDEX: 28 KG/M2 | TEMPERATURE: 98.1 F | HEIGHT: 63 IN | WEIGHT: 158 LBS

## 2020-07-08 PROCEDURE — 99204 OFFICE O/P NEW MOD 45 MIN: CPT | Performed by: OTOLARYNGOLOGY

## 2020-07-08 RX ORDER — PSEUDOEPHEDRINE HYDROCHLORIDE 30 MG/1
30 TABLET ORAL EVERY 4 HOURS PRN
Status: ON HOLD | COMMUNITY
End: 2021-03-29 | Stop reason: HOSPADM

## 2020-07-08 RX ORDER — AZELASTINE 1 MG/ML
1 SPRAY, METERED NASAL 2 TIMES DAILY
Qty: 3 BOTTLE | Refills: 3 | Status: SHIPPED
Start: 2020-07-08 | End: 2021-02-16

## 2020-07-08 SDOH — HEALTH STABILITY: MENTAL HEALTH: HOW MANY STANDARD DRINKS CONTAINING ALCOHOL DO YOU HAVE ON A TYPICAL DAY?: 1 OR 2

## 2020-07-08 SDOH — HEALTH STABILITY: MENTAL HEALTH: HOW OFTEN DO YOU HAVE A DRINK CONTAINING ALCOHOL?: 4 OR MORE TIMES A WEEK

## 2020-07-08 ASSESSMENT — ENCOUNTER SYMPTOMS
GASTROINTESTINAL NEGATIVE: 1
SINUS PRESSURE: 1
ABDOMINAL PAIN: 0
RESPIRATORY NEGATIVE: 1
RHINORRHEA: 1
COLOR CHANGE: 0
SHORTNESS OF BREATH: 0
EYES NEGATIVE: 1

## 2020-07-08 NOTE — PROGRESS NOTES
Subjective:      Patient ID:  Christa Rodriguez is a 64 y.o. female. HPI:    Patient presents today for sinsus pressure and drainage . Condition has been present for 1 month(s). Pt had no headaches prior to this, patient also having balance issues. She is on flonase and still having issues. Also on singulare daily  Pt has been on shots 20 years ago.      Past Medical History:   Diagnosis Date    Ascending aorta enlargement (HCC)     Difficulty swallowing     Endometriosis     Hiatal hernia     Normal cardiac stress test 2012    Osteoporosis      Past Surgical History:   Procedure Laterality Date    COLONOSCOPY  2013    Dr Sneha Tapia  2007    Dr. Seng Silverman  10/1995    MYOMECTOMY  03/1996    SHOULDER SURGERY Right 07/2002    SHOULDER SURGERY Left 09/2002    UPPER GASTROINTESTINAL ENDOSCOPY N/A 6/11/2018    EGD BIOPSY performed by Emerald Starks MD [facility]     Family History   Problem Relation Age of Onset    Diabetes Mother     High Blood Pressure Mother     Other Mother         brain tumor    Heart Disease Father 48        CABG x 4 (twice)    Cancer Father 66        esophageal    Diabetes Father     High Cholesterol Father     Other Sister 54        MS    High Cholesterol Sister     Osteoporosis Sister     High Cholesterol Sister     Mult Sclerosis Sister     Osteoporosis Sister     Other Other         autoimmune disease     Social History     Socioeconomic History    Marital status:      Spouse name: None    Number of children: None    Years of education: None    Highest education level: None   Occupational History    Occupation: unemployed- OFFICE   Social Needs    Financial resource strain: Not hard at all   [place] insecurity     Worry: Never true     Inability: Never true    Transportation needs     Medical: No     Non-medical: No   Tobacco Use    Smoking status: Former Smoker     Packs/day: 1.00     Years: 30.00     Pack years: 30.00 Types: Cigarettes     Last attempt to quit: 10/10/2007     Years since quittin.7    Smokeless tobacco: Never Used   Substance and Sexual Activity    Alcohol use: Yes     Frequency: 4 or more times a week     Drinks per session: 1 or 2     Binge frequency: Daily or almost daily     Comment: 2 or 3 beer or wine weekly    Drug use: No    Sexual activity: Yes     Partners: Male   Lifestyle    Physical activity     Days per week: None     Minutes per session: None    Stress: None   Relationships    Social connections     Talks on phone: None     Gets together: None     Attends [employer] service: None     Active member of club or organization: None     Attends meetings of clubs or organizations: None     Relationship status: None    Intimate partner violence     Fear of current or ex partner: None     Emotionally abused: None     Physically abused: None     Forced sexual activity: None   Other Topics Concern    None   Social History Narrative    None     Allergies   Allergen Reactions    Benadryl [Diphenhydramine] Anaphylaxis    Minocycline Nausea Only     dizziness       Review of Systems   Constitutional: Negative. Negative for fever. HENT: Positive for congestion, postnasal drip, rhinorrhea, sinus pressure and sneezing. Eyes: Negative. Negative for visual disturbance. Respiratory: Negative. Negative for shortness of breath. Cardiovascular: Negative. Negative for chest pain. Gastrointestinal: Negative. Negative for abdominal pain. Genitourinary: Negative. Musculoskeletal: Negative. Skin: Negative. Negative for color change. Allergic/Immunologic: Positive for environmental allergies. Neurological: Negative. Psychiatric/Behavioral: Negative. Negative for behavioral problems and hallucinations. All other systems reviewed and are negative.               Objective:     Vitals:    20 1530   Temp: 98.1 °F (36.7 °C)     Physical Exam  Vitals signs and nursing note reviewed. Constitutional:       Appearance: She is well-developed. HENT:      Head: Normocephalic and atraumatic. Right Ear: Hearing, tympanic membrane, ear canal and external ear normal.      Left Ear: Hearing, tympanic membrane, ear canal and external ear normal.      Nose: Mucosal edema and rhinorrhea present. Mouth/Throat:      Pharynx: Uvula midline. Eyes:      Conjunctiva/sclera: Conjunctivae normal.      Pupils: Pupils are equal, round, and reactive to light. Neck:      Musculoskeletal: Normal range of motion and neck supple. Cardiovascular:      Rate and Rhythm: Normal rate and regular rhythm. Heart sounds: Normal heart sounds. Pulmonary:      Effort: Pulmonary effort is normal.      Breath sounds: Normal breath sounds. Abdominal:      General: Bowel sounds are normal.      Palpations: Abdomen is soft. Skin:     General: Skin is warm and dry. Neurological:      Mental Status: She is alert and oriented to person, place, and time. Assessment:       Diagnosis Orders   1. Facial pain     2. Seasonal allergic rhinitis due to pollen     3. Chronic pansinusitis                Plan:      Allergic rhinitis  I do want to continue fluticasone (Flonase) Astelin for now. Call or return to clinic prn if these symptoms worsen or fail to improve as anticipated.     Get CT for sinus anatomy    Follow up in 1 month(s)

## 2020-07-15 ENCOUNTER — TELEPHONE (OUTPATIENT)
Dept: ENT CLINIC | Age: 60
End: 2020-07-15

## 2020-07-15 NOTE — TELEPHONE ENCOUNTER
Spoke with Patient 7/13 she had not received flonase yet. Ben Espinoza should receive 7/14 but f/u is 8/5 and will only put her on it for 3-3.5wk, patient wants to know if okay with Dr. Harjinder Carr. Spoke with Dr Harjinder Carr 7/14 and he said that is fine.     Patient verbally notified 7/15

## 2020-07-30 ENCOUNTER — HOSPITAL ENCOUNTER (OUTPATIENT)
Dept: CT IMAGING | Age: 60
Discharge: HOME | End: 2020-08-01
Payer: OTHER GOVERNMENT

## 2020-07-30 PROCEDURE — 70486 CT MAXILLOFACIAL W/O DYE: CPT

## 2020-08-05 ENCOUNTER — OFFICE VISIT (OUTPATIENT)
Dept: ENT CLINIC | Age: 60
End: 2020-08-05
Payer: OTHER GOVERNMENT

## 2020-08-05 VITALS — WEIGHT: 168 LBS | BODY MASS INDEX: 29.77 KG/M2 | HEIGHT: 63 IN | TEMPERATURE: 97.3 F

## 2020-08-05 PROCEDURE — 99213 OFFICE O/P EST LOW 20 MIN: CPT | Performed by: OTOLARYNGOLOGY

## 2020-08-05 ASSESSMENT — ENCOUNTER SYMPTOMS
ALLERGIC/IMMUNOLOGIC NEGATIVE: 1
SINUS PAIN: 1
SHORTNESS OF BREATH: 0
VOMITING: 0
EYE DISCHARGE: 0
COLOR CHANGE: 0
SINUS PRESSURE: 1
FACIAL SWELLING: 0
STRIDOR: 0
COUGH: 0
NAUSEA: 0
EYE REDNESS: 0

## 2020-08-05 NOTE — PROGRESS NOTES
Subjective:     Patient ID:  Twylla Severe is a 64 y.o. female. HPI:    Pt returns for review of CT Sinus. There are not changes since last visit. She notes continued facial pain/pressure, daily, constant, relieved by nothing. Pt has beenon fluticasone (Flonase) for 12 month(s) and is doing adequately  Pt notes allergic rhinitis that was treated with 20 years ago with allergy shots she notes her pain/pressure and symptoms resolved       Patient'smedications, allergies, past medical, surgical, social and family histories werereviewed and updated as appropriate. Review of Systems   Constitutional: Negative for chills, fatigue and fever. HENT: Positive for sinus pressure and sinus pain. Negative for ear discharge, ear pain and facial swelling. Eyes: Negative for discharge and redness. Respiratory: Negative for cough, shortness of breath and stridor. Gastrointestinal: Negative for nausea and vomiting. Endocrine: Negative. Genitourinary: Negative. Musculoskeletal: Negative. Skin: Negative for color change and rash. Allergic/Immunologic: Negative. Neurological: Positive for dizziness. Negative for speech difficulty and headaches. Hematological: Negative. Psychiatric/Behavioral: Negative for agitation and confusion. All other systems reviewed and are negative. Objective:   Physical Exam  Constitutional:       Appearance: Normal appearance. She is normal weight. HENT:      Head: Normocephalic and atraumatic. Right Ear: Tympanic membrane, ear canal and external ear normal.      Left Ear: Tympanic membrane and external ear normal.      Nose: Nose normal.      Mouth/Throat:      Mouth: Mucous membranes are moist.   Eyes:      Pupils: Pupils are equal, round, and reactive to light. Neck:      Musculoskeletal: Normal range of motion. Cardiovascular:      Rate and Rhythm: Normal rate.    Pulmonary:      Effort: Pulmonary effort is normal.   Musculoskeletal: Normal range of motion. Skin:     General: Skin is warm and dry. Neurological:      General: No focal deficit present. Mental Status: She is alert and oriented to person, place, and time. CT sinuses (my review)    no - hallercells -   yes - eliseo bullosa - right moderate  yes - Enlarged inferior turbinates - bilateral   no- Obstructed OMC -  no - maxillary sinus disease   no- frontal sinus disease -  Left frontal sinus   Absent -  no   Size - Moderate  Right frontal sinus   Absent-  no   Size - Moderate  yes - Deviated nasal septum - left,  moderate  yes - Septal Spur - left,  moderate   no - posterior accessory os -                  Assessment:       Diagnosis Orders   1. Seasonal allergic rhinitis due to pollen  [facility]   2. Facial pain  [facility]              Plan:      Recommend allergy eval for possible immunotherapy   Pt a surgical candidate, she would like to try immunotherapy first and will recheck in 6 months  Follow up in 6 month(s)                         Yaw Lucero  1960    I have discussed the case, including pertinent history and exam findings with the resident. I have seen and examined the patient and the key elements of the encounter have been performed by me. I agree with the assessment, plan and orders as documented by the  resident              Remainder of medical problems as per  resident note. Patient seen and examined. Agree with above exam, assessment and plan.       Electronically signed by Burt Rojo DO on 8/11/20 at 11:55 AM EDT

## 2020-08-25 ENCOUNTER — TELEPHONE (OUTPATIENT)
Dept: ENT CLINIC | Age: 60
End: 2020-08-25

## 2020-10-20 ENCOUNTER — NURSE ONLY (OUTPATIENT)
Dept: FAMILY MEDICINE CLINIC | Age: 60
End: 2020-10-20
Payer: OTHER GOVERNMENT

## 2020-10-20 PROCEDURE — 90686 IIV4 VACC NO PRSV 0.5 ML IM: CPT | Performed by: FAMILY MEDICINE

## 2020-10-20 PROCEDURE — 90471 IMMUNIZATION ADMIN: CPT | Performed by: FAMILY MEDICINE

## 2020-12-08 ENCOUNTER — TELEPHONE (OUTPATIENT)
Dept: ADMINISTRATIVE | Age: 60
End: 2020-12-08

## 2020-12-08 NOTE — TELEPHONE ENCOUNTER
Called and cancelled patients appointment on 12/22 for her 6 mo. She did not reschedule at this time as she would like to talk to Dr. Sakshi Sweeney regarding a possible surgery with ENT in February. She will explain all details at call. Please contact patient.

## 2020-12-16 ENCOUNTER — TELEPHONE (OUTPATIENT)
Dept: ENT CLINIC | Age: 60
End: 2020-12-16

## 2020-12-17 ENCOUNTER — NURSE ONLY (OUTPATIENT)
Dept: FAMILY MEDICINE CLINIC | Age: 60
End: 2020-12-17
Payer: OTHER GOVERNMENT

## 2020-12-17 DIAGNOSIS — E78.5 HYPERLIPIDEMIA, UNSPECIFIED HYPERLIPIDEMIA TYPE: ICD-10-CM

## 2020-12-17 LAB
ALBUMIN SERPL-MCNC: 4.5 G/DL (ref 3.5–5.2)
ALP BLD-CCNC: 79 U/L (ref 35–104)
ALT SERPL-CCNC: 13 U/L (ref 0–32)
ANION GAP SERPL CALCULATED.3IONS-SCNC: 13 MMOL/L (ref 7–16)
AST SERPL-CCNC: 22 U/L (ref 0–31)
BASOPHILS ABSOLUTE: 0.04 E9/L (ref 0–0.2)
BASOPHILS RELATIVE PERCENT: 0.9 % (ref 0–2)
BILIRUB SERPL-MCNC: 0.3 MG/DL (ref 0–1.2)
BUN BLDV-MCNC: 11 MG/DL (ref 6–20)
CALCIUM SERPL-MCNC: 9.8 MG/DL (ref 8.6–10.2)
CHLORIDE BLD-SCNC: 102 MMOL/L (ref 98–107)
CHOLESTEROL, TOTAL: 224 MG/DL (ref 0–199)
CO2: 24 MMOL/L (ref 22–29)
CREAT SERPL-MCNC: 0.7 MG/DL (ref 0.5–1)
EOSINOPHILS ABSOLUTE: 0.07 E9/L (ref 0.05–0.5)
EOSINOPHILS RELATIVE PERCENT: 1.6 % (ref 0–6)
GFR AFRICAN AMERICAN: >60
GFR NON-AFRICAN AMERICAN: >60 ML/MIN/1.73
GLUCOSE BLD-MCNC: 94 MG/DL (ref 74–99)
HCT VFR BLD CALC: 45.8 % (ref 34–48)
HDLC SERPL-MCNC: 75 MG/DL
HEMOGLOBIN: 14.4 G/DL (ref 11.5–15.5)
IMMATURE GRANULOCYTES #: 0 E9/L
IMMATURE GRANULOCYTES %: 0 % (ref 0–5)
LDL CHOLESTEROL CALCULATED: 137 MG/DL (ref 0–99)
LYMPHOCYTES ABSOLUTE: 2.3 E9/L (ref 1.5–4)
LYMPHOCYTES RELATIVE PERCENT: 52.9 % (ref 20–42)
MCH RBC QN AUTO: 29.9 PG (ref 26–35)
MCHC RBC AUTO-ENTMCNC: 31.4 % (ref 32–34.5)
MCV RBC AUTO: 95 FL (ref 80–99.9)
MONOCYTES ABSOLUTE: 0.37 E9/L (ref 0.1–0.95)
MONOCYTES RELATIVE PERCENT: 8.5 % (ref 2–12)
NEUTROPHILS ABSOLUTE: 1.57 E9/L (ref 1.8–7.3)
NEUTROPHILS RELATIVE PERCENT: 36.1 % (ref 43–80)
PDW BLD-RTO: 12.9 FL (ref 11.5–15)
PLATELET # BLD: 255 E9/L (ref 130–450)
PMV BLD AUTO: 11.1 FL (ref 7–12)
POTASSIUM SERPL-SCNC: 5.5 MMOL/L (ref 3.5–5)
RBC # BLD: 4.82 E12/L (ref 3.5–5.5)
SODIUM BLD-SCNC: 139 MMOL/L (ref 132–146)
TOTAL PROTEIN: 7.1 G/DL (ref 6.4–8.3)
TRIGL SERPL-MCNC: 60 MG/DL (ref 0–149)
TSH SERPL DL<=0.05 MIU/L-ACNC: 2.62 UIU/ML (ref 0.27–4.2)
VLDLC SERPL CALC-MCNC: 12 MG/DL
WBC # BLD: 4.4 E9/L (ref 4.5–11.5)

## 2020-12-17 PROCEDURE — 36415 COLL VENOUS BLD VENIPUNCTURE: CPT | Performed by: FAMILY MEDICINE

## 2021-01-20 RX ORDER — OMEPRAZOLE 40 MG/1
CAPSULE, DELAYED RELEASE ORAL
Qty: 90 CAPSULE | Refills: 3 | Status: SHIPPED | OUTPATIENT
Start: 2021-01-20

## 2021-02-03 ENCOUNTER — NURSE ONLY (OUTPATIENT)
Dept: FAMILY MEDICINE CLINIC | Age: 61
End: 2021-02-03
Payer: OTHER GOVERNMENT

## 2021-02-03 DIAGNOSIS — R30.0 DYSURIA: Primary | ICD-10-CM

## 2021-02-03 DIAGNOSIS — R30.0 DYSURIA: ICD-10-CM

## 2021-02-03 LAB
BILIRUBIN, POC: NORMAL
BLOOD URINE, POC: NORMAL
CLARITY, POC: CLEAR
COLOR, POC: YELLOW
GLUCOSE URINE, POC: 100
KETONES, POC: NORMAL
LEUKOCYTE EST, POC: NORMAL
NITRITE, POC: POSITIVE
PH, POC: 6
PROTEIN, POC: 30
SPECIFIC GRAVITY, POC: 1.02
UROBILINOGEN, POC: 0.2

## 2021-02-03 PROCEDURE — 81002 URINALYSIS NONAUTO W/O SCOPE: CPT | Performed by: FAMILY MEDICINE

## 2021-02-03 RX ORDER — SULFAMETHOXAZOLE AND TRIMETHOPRIM 800; 160 MG/1; MG/1
1 TABLET ORAL 2 TIMES DAILY
Qty: 10 TABLET | Refills: 0 | Status: SHIPPED | OUTPATIENT
Start: 2021-02-03 | End: 2021-02-08

## 2021-02-05 LAB
ORGANISM: ABNORMAL
URINE CULTURE, ROUTINE: ABNORMAL

## 2021-02-10 ENCOUNTER — OFFICE VISIT (OUTPATIENT)
Dept: ENT CLINIC | Age: 61
End: 2021-02-10
Payer: OTHER GOVERNMENT

## 2021-02-10 VITALS — WEIGHT: 149 LBS | BODY MASS INDEX: 26.4 KG/M2 | HEIGHT: 63 IN | TEMPERATURE: 97.5 F | RESPIRATION RATE: 18 BRPM

## 2021-02-10 DIAGNOSIS — J30.1 SEASONAL ALLERGIC RHINITIS DUE TO POLLEN: Primary | ICD-10-CM

## 2021-02-10 DIAGNOSIS — J32.4 CHRONIC PANSINUSITIS: ICD-10-CM

## 2021-02-10 DIAGNOSIS — R51.9 FACIAL PAIN: ICD-10-CM

## 2021-02-10 PROCEDURE — 99213 OFFICE O/P EST LOW 20 MIN: CPT | Performed by: OTOLARYNGOLOGY

## 2021-02-10 ASSESSMENT — ENCOUNTER SYMPTOMS
COUGH: 0
FACIAL SWELLING: 0
COLOR CHANGE: 0
VOMITING: 0
NAUSEA: 0
EYE REDNESS: 0
ALLERGIC/IMMUNOLOGIC NEGATIVE: 1
STRIDOR: 0
EYE DISCHARGE: 0
SHORTNESS OF BREATH: 0
SINUS PRESSURE: 1
SINUS PAIN: 1

## 2021-02-10 NOTE — PROGRESS NOTES
Subjective:      Patient ID:  Tania Bell is a 64 y.o. female. HPI:  Pt returns for recheck of allergies. History of   no surgery  When?  year:     Nasal Steroid: yes   Name: fluticasone (Flonase)   Still taking: no   reason for stopping: taking prn    Other therapy:   Astelin- yes  oral antihistamine- none  leukotriene inhibitor- none  oral decongestant- none    which has been  not very effective     Pt has been on therapy for 6 month(s) and is doing poorly    Pt has seen allergy ad the shots were not covered. She was allergic to molds. The patient is complaining of nasal congestion and rhinorrhea    The patients worst time of year is fall and spring      Patient's medications, allergies, past medical, surgical, social and family histories were reviewed and updated as appropriate. Review of Systems   Constitutional: Negative for chills, fatigue and fever. HENT: Positive for sinus pressure and sinus pain. Negative for ear discharge, ear pain and facial swelling. Eyes: Negative for discharge and redness. Respiratory: Negative for cough, shortness of breath and stridor. Gastrointestinal: Negative for nausea and vomiting. Endocrine: Negative. Genitourinary: Negative. Musculoskeletal: Negative. Skin: Negative for color change and rash. Allergic/Immunologic: Negative. Neurological: Positive for dizziness. Negative for speech difficulty and headaches. Hematological: Negative. Psychiatric/Behavioral: Negative for agitation and confusion. All other systems reviewed and are negative. Objective:     Vitals:    02/10/21 1301   Resp: 18   Temp: 97.5 °F (36.4 °C)     Physical Exam  Constitutional:       Appearance: Normal appearance. She is normal weight. HENT:      Head: Normocephalic and atraumatic.       Right Ear: Tympanic membrane, ear canal and external ear normal.      Left Ear: Tympanic membrane and external ear normal.      Nose: Nose normal. Mouth/Throat:      Mouth: Mucous membranes are moist.   Eyes:      Pupils: Pupils are equal, round, and reactive to light. Neck:      Musculoskeletal: Normal range of motion. Cardiovascular:      Rate and Rhythm: Normal rate. Pulmonary:      Effort: Pulmonary effort is normal.   Musculoskeletal: Normal range of motion. Skin:     General: Skin is warm and dry. Neurological:      General: No focal deficit present. Mental Status: She is alert and oriented to person, place, and time. Assessment:       Diagnosis Orders   1. Seasonal allergic rhinitis due to pollen     2. Facial pain     3. Chronic pansinusitis                Plan:      I recommend:    Functional endoscopic sinus surgery with bilateral maxillary, frontal and sphenoid antrostomies, submucous resection of inferior turbinates, septoplasty    The procedure risks and benefits were discussed with the patient and family. Pt and family understood and decided to proceed with the surgery. Surgical risks include:    --Injury to the lining of the brain, meningitis, stroke and death - most common in the approach to the frontal sinus but may also occur in operations on the sphenoid and ethmoid sinuses  --Injury to the carotid artery - most common in sphenoid sinus surgery  --Injury to the orbit, eye and eye muscles. Most commonly the medial rectus muscle is injured, this can cause double vision. The optic nerve can be injured during sphenoid sinus surgery. Injury to the Nasolacrimal Duct can cause tearing. -- Bleeding or air in the orbit. If this happens, blindness can occur due to pressure. Relief of the pressure is mandatory to prevent loss of sight. Septoplasty  (nasal septum and turbinate surgery)       Surgical risks include:    -- Recurrence of the septal deviation with recurrence of the airway obstruction. The nasal septum is made of cartilage.  Cartilage has a memory and sometimes over the course of hours will bend back to the preoperative position. -- Septal Hematoma: This can occur if a drainage hole was not created in the septum. (Note: most surgeons have the problem of too many holes created during surgery, not too few). The cartilage has no blood supply and receives its nutrients from the overlying mucosa. A septal hematoma elevates the mucosal flaps off of the cartilage, resulting in cartilage death and usually infection. If the entire septum is lost the nose may collapse creating a saddle deformity. -- Hole in the nasal septum. This occurs where there are two opposing holes in the nasal septal flaps. A hole in the nasal septum may result in disturbing crusting, bleeding and whistling.   -- Saddle Nose: If too much supporting cartilage is removed, the mid-portion of the nose may sag creating a \"Saddle Nose\" deformity.          Follow up in 1 week(s)

## 2021-02-10 NOTE — PATIENT INSTRUCTIONS
[facility] choosing [facility] or [facility] practice. We are committed to your medical treatment and  care. If you need to reschedule or cancel your surgery or follow up  appointment, please call the surgery scheduler at [phone]. INSTRUCTIONS FOR SURGERY    Nothing to eat or drink after midnight the night before surgery unless surgery is at [facility] or otherwise instructed by the hospital.    DO NOT TAKE ANY ASPIRIN PRODUCTS 7 days prior to surgery-unless required by your cardiologist or primary care physician. Tylenol only. No Advil, Motrin, Aleve, or Ibuprofen    Any illegal drugs in your system (including Marijuana even if legally prescribed) will result in your surgery being cancelled. Please be sure to check with our office or the hospital on time frame for the drugs to be out of your system. Should your insurance change at any time you must contact our office. Failure to do so may result in your surgery being rescheduled. If you need paperwork filled out for work, you must give the office 2 weeks to complete and submit the forms.       [address], [address] [facility] call you a couple days prior to your surgery and give you further instructions, if any questions call them at [phone]

## 2021-02-16 ENCOUNTER — OFFICE VISIT (OUTPATIENT)
Dept: FAMILY MEDICINE CLINIC | Age: 61
End: 2021-02-16
Payer: OTHER GOVERNMENT

## 2021-02-16 VITALS
OXYGEN SATURATION: 100 % | HEART RATE: 94 BPM | HEIGHT: 63 IN | BODY MASS INDEX: 26.03 KG/M2 | DIASTOLIC BLOOD PRESSURE: 89 MMHG | WEIGHT: 146.9 LBS | TEMPERATURE: 98.5 F | SYSTOLIC BLOOD PRESSURE: 137 MMHG | RESPIRATION RATE: 16 BRPM

## 2021-02-16 DIAGNOSIS — E78.5 HYPERLIPIDEMIA, UNSPECIFIED HYPERLIPIDEMIA TYPE: ICD-10-CM

## 2021-02-16 DIAGNOSIS — E87.5 HYPERKALEMIA: ICD-10-CM

## 2021-02-16 DIAGNOSIS — Z00.00 ANNUAL PHYSICAL EXAM: Primary | ICD-10-CM

## 2021-02-16 LAB — POTASSIUM SERPL-SCNC: 4.5 MMOL/L (ref 3.5–5)

## 2021-02-16 PROCEDURE — 99396 PREV VISIT EST AGE 40-64: CPT | Performed by: FAMILY MEDICINE

## 2021-02-16 PROCEDURE — 36415 COLL VENOUS BLD VENIPUNCTURE: CPT | Performed by: FAMILY MEDICINE

## 2021-02-16 ASSESSMENT — PATIENT HEALTH QUESTIONNAIRE - PHQ9
SUM OF ALL RESPONSES TO PHQ9 QUESTIONS 1 & 2: 2
1. LITTLE INTEREST OR PLEASURE IN DOING THINGS: 1
SUM OF ALL RESPONSES TO PHQ QUESTIONS 1-9: 2

## 2021-02-16 NOTE — PROGRESS NOTES
no diarrhea, no heartburn  Female Reproductive:  No hot flashes, no abnormal vaginal discharge, no pain with menstruation, no pelvic pain  Musculoskeletal:  No joint pain, no leg cramps, no back pain, no muscle aches  Respiratory:  No shortness of breath, no orthopnea, no wheezing, no CAMARGO, no hemoptysis  Urology:  No blood in the urine, no urinary frequency, no urinary incontinence, no urinary urgency, no nocturia, no dysuria  Neurology:  No numbness/tingling, no dizziness, no weakness  Psychology:  No depression, no sleep disturbances, no suicidal ideation, no anxiety  Physical Exam:  Vitals:    02/16/21 1138   BP: 137/89   Pulse: 94   Resp: 16   Temp: 98.5 °F (36.9 °C)   TempSrc: Temporal   SpO2: 100%   Weight: 146 lb 14.4 oz (66.6 kg)   Height: 5' 3\" (1.6 m)     General:  Patient alert and oriented x 3, NAD, pleasant  HEENT:  Atraumatic, normocephalic, PERRLA, EOMI, clear conjunctiva, TMs clear, nose-clear, throat - no erythema, tonsils- wnl  Neck:  Supple, no goiter, no carotid bruits, no lymphadenopathy  Lungs:  CTA B  Heart:  RRR, no murmurs, gallops or rubs  Abdomen:  Soft, NTND, + bowel sounds  Back: full ROM, no CVA tenderness  Extremities:  No clubbing, cyanosis or edema  Neuro:  CN II-XII grossly intact, 5/5 strength in bilateral upper and lower extremities, 2 + reflexes. Skin: unremarkable    Assessment/Plan:  Tony Kimble was seen today for annual exam.    Diagnoses and all orders for this visit:    Annual physical exam    Hyperkalemia  -     POTASSIUM; Future    Hyperlipidemia, unspecified hyperlipidemia type      As above. Call or go to ED immediately if symptoms worsen or persist.  Return in about 6 months (around 8/16/2021). or sooner if necessary. Educational materials and/or home exercises printed for patient's review and were included in patient instructions on his/her After Visit Summary and given to patient at the end of visit.       Counseled regarding above diagnosis, including possible risks and complications,  especially if left uncontrolled. Counseled regarding the possible side effects, risks, benefits and alternatives to treatment; patient and/or guardian verbalizes understanding, agrees, feels comfortable with and wishes to proceed with above treatment plan. Advised patient to call with any new medication issues, and read all Rx info from pharmacy to assure aware of all possible risks and side effects of medication before taking. Reviewed age and gender appropriate health screening exams and vaccinations. Advised patient regarding importance of keeping up with recommended health maintenance and to schedule as soon as possible if overdue, as this is important in assessing for undiagnosed pathology, especially cancer, as well as protecting against potentially harmful/life threatening disease. Patient and/or guardian verbalizes understanding and agrees with above counseling, assessment and plan. All questions answered. Shan Lutz MD  2/16/2021    I have personally reviewed and updated the chief complaint, HPI, Past Medical, Family and Social History, as well as the above Review of Systems.

## 2021-03-22 ENCOUNTER — TELEPHONE (OUTPATIENT)
Dept: ENT CLINIC | Age: 61
End: 2021-03-22

## 2021-03-22 NOTE — PROGRESS NOTES
Have you been tested for COVID  Yes  Scheduled for COVID test 3-24-21 for OR scheduled 3-29-21          Have you been told you were positive for COVID No  Have you had any known exposure to someone that is positive for COVID No  Do you have a cough                   No              Do you have shortness of breath No                 Do you have a sore throat            No                Are you having chills                    No                Are you having muscle aches. No                    Please come to the hospital wearing a mask and have your significant other wear a mask as well. Both of you should check your temperature before leaving to come here,  if it is 100 or higher please call [phone] for instruction. Christelle PRE-ADMISSION TESTING INSTRUCTIONS    The Preadmission Testing patient is instructed accordingly using the following criteria (check applicable):    ARRIVAL INSTRUCTIONS:  [x] Parking the day of Surgery is located in the Main Entrance lot. Upon entering the door, make an immediate right to the surgery reception desk    [x] Bring photo ID and insurance card    [] Bring in a copy of Living will or Durable Power of  papers.     [x] Please be sure to arrange for responsible adult to provide transportation to and from the hospital    [x] Please arrange for responsible adult to be with you for the 24 hour period post procedure due to having anesthesia      GENERAL INSTRUCTIONS:    [x] Nothing by mouth after midnight, including gum, candy, mints or water    [x] You may brush your teeth, but do not swallow any water    [x] Take medications as instructed with 1-2 oz of water    [x] Stop herbal supplements and vitamins 5 days prior to procedure    [x] Follow preop dosing of blood thinners per physician instructions    [] Take 1/2 dose of evening insulin, but no insulin after midnight    [] No oral diabetic medications after midnight    [] If diabetic and have low blood sugar or feel symptomatic, take 1-2oz apple juice only    [] Bring inhalers day of surgery    [] Bring C-PAP/ Bi-Pap day of surgery    [] Bring urine specimen day of surgery    [x] Shower or bath with soap, lather and rinse well, AM of Surgery, no lotion, powders or creams to surgical site    [] Follow bowel prep as instructed per surgeon    [x] No tobacco products within 24 hours of surgery     [x] No alcohol or illegal drug use within 24 hours of surgery.     [x] Jewelry, body piercing's, eyeglasses, contact lenses and dentures are not permitted into surgery (bring cases)      [x] Please do not wear any nail polish, make up or hair products on the day of surgery    [x] You can expect a call the business day prior to procedure to notify you if your arrival time changes    [x] If you receive a survey after surgery we would greatly appreciate your comments    [] Parent/guardian of a minor must accompany their child and remain on the premises  the entire time they are under our care     [] Pediatric patients may bring favorite toy, blanket or comfort item with them    [] A caregiver or family member must remain with the patient during their stay if they are mentally handicapped, have dementia, disoriented or unable to use a call light or would be a safety concern if left unattended    [x] Please notify surgeon if you develop any illness between now and time of surgery (cold, cough, sore throat, fever, nausea, vomiting) or any signs of infections  including skin, wounds, and dental.    [x]  The Outpatient Pharmacy is available to fill your prescription here on your day of surgery, ask your preop nurse for details    [x] Other instructions    EDUCATIONAL MATERIALS PROVIDED:    [] PAT Preoperative Education Packet/Booklet     [] Medication List    [] Transfusion bracelet applied with instructions    [] Shower with soap, lather and rinse well, and use CHG wipes provided the evening before surgery as instructed    [] Incentive spirometer with instructions

## 2021-03-22 NOTE — TELEPHONE ENCOUNTER
Prior Authorization Form:      DEMOGRAPHICS:                     Patient Name:  Mahesh Copeland  Patient :  1960            Insurance:  Payor: [employer] / Plan: [employer]  / Product Type: *No Product type* /   Insurance ID Number:    Payor/Plan Subscr  Sex Relation Sub.  Ins. ID Effective Group Num   1. [facility] Buddy Overcast 1960 Female Self [id number] 3/21/18                                    [address]         DIAGNOSIS & PROCEDURE:                       Procedure/Operation: FESS, Septoplasty, SMRITS           CPT Code: [id number]    Diagnosis:  Chronic Sinusitis, Deviated Nasal Septum,Turbinate Hypertrophy    ICD10 Code: J32.0,J34.2,J34.3    Location:  [facility]    Surgeon:  Lalo    SCHEDULING INFORMATION:                          Date: 3/29/2021    Time: N/A              Anesthesia:  General                                                       Status:  Outpatient        Special Comments:  Attach pcp notes       Electronically signed by Wilbert Montana MA on 3/22/2021 at 12:39 PM

## 2021-03-23 ENCOUNTER — HOSPITAL ENCOUNTER (OUTPATIENT)
Dept: PREADMISSION TESTING | Age: 61
Discharge: HOME | End: 2021-03-23
Payer: OTHER GOVERNMENT

## 2021-03-23 LAB
EKG ATRIAL RATE: 77 BPM
EKG P AXIS: 38 DEGREES
EKG P-R INTERVAL: 152 MS
EKG Q-T INTERVAL: 398 MS
EKG QRS DURATION: 84 MS
EKG QTC CALCULATION (BAZETT): 450 MS
EKG R AXIS: 11 DEGREES
EKG T AXIS: 36 DEGREES
EKG VENTRICULAR RATE: 77 BPM

## 2021-03-24 ENCOUNTER — HOSPITAL ENCOUNTER (OUTPATIENT)
Age: 61
Discharge: HOME | End: 2021-03-26
Payer: OTHER GOVERNMENT

## 2021-03-24 DIAGNOSIS — Z01.818 PREOP TESTING: ICD-10-CM

## 2021-03-24 PROCEDURE — U0003 INFECTIOUS AGENT DETECTION BY NUCLEIC ACID (DNA OR RNA); SEVERE ACUTE RESPIRATORY SYNDROME CORONAVIRUS 2 (SARS-COV-2) (CORONAVIRUS DISEASE [COVID-19]), AMPLIFIED PROBE TECHNIQUE, MAKING USE OF HIGH THROUGHPUT TECHNOLOGIES AS DESCRIBED BY CMS-2020-01-R: HCPCS

## 2021-03-24 PROCEDURE — U0005 INFEC AGEN DETEC AMPLI PROBE: HCPCS

## 2021-03-25 LAB — SARS-COV-2, PCR: NOT DETECTED

## 2021-03-28 ENCOUNTER — ANESTHESIA EVENT (OUTPATIENT)
Dept: OPERATING ROOM | Age: 61
End: 2021-03-28
Payer: OTHER GOVERNMENT

## 2021-03-28 NOTE — H&P
Subjective:      Patient ID:  Patricia Oliveira is a 64 y.o. female.     HPI:  Pt returns for recheck of allergies.        History of   no surgery  When?  year:      Nasal Steroid: yes              Name: fluticasone (Flonase)              Still taking: no              reason for stopping: taking prn     Other therapy:   Astelin- yes  oral antihistamine- none  leukotriene inhibitor- none  oral decongestant- none     which has been  not very effective      Pt has been on therapy for 6 month(s) and is doing poorly     Pt has seen allergy ad the shots were not covered. She was allergic to molds.      The patient is complaining of nasal congestion and rhinorrhea     The patients worst time of year is fall and spring        Patient's medications, allergies, past medical, surgical, social and family histories were reviewed and updated as appropriate.           Review of Systems   Constitutional: Negative for chills, fatigue and fever. HENT: Positive for sinus pressure and sinus pain. Negative for ear discharge, ear pain and facial swelling. Eyes: Negative for discharge and redness. Respiratory: Negative for cough, shortness of breath and stridor. Gastrointestinal: Negative for nausea and vomiting. Endocrine: Negative. Genitourinary: Negative. Musculoskeletal: Negative. Skin: Negative for color change and rash. Allergic/Immunologic: Negative. Neurological: Positive for dizziness. Negative for speech difficulty and headaches. Hematological: Negative. Psychiatric/Behavioral: Negative for agitation and confusion. All other systems reviewed and are negative.                    Objective:          Vitals:     02/10/21 1301   Resp: 18   Temp: 97.5 °F (36.4 °C)      Physical Exam  Constitutional:       Appearance: Normal appearance. She is normal weight. HENT:      Head: Normocephalic and atraumatic.       Right Ear: Tympanic membrane, ear canal and external ear normal.      Left Ear: Tympanic membrane and external ear normal.      Nose: Nose normal.      Mouth/Throat:      Mouth: Mucous membranes are moist.   Eyes:      Pupils: Pupils are equal, round, and reactive to light. Neck:      Musculoskeletal: Normal range of motion. Cardiovascular:      Rate and Rhythm: Normal rate. Pulmonary:      Effort: Pulmonary effort is normal.   Musculoskeletal: Normal range of motion. Skin:     General: Skin is warm and dry. Neurological:      General: No focal deficit present. Mental Status: She is alert and oriented to person, place, and time.                      Assessment:        Diagnosis Orders   1. Seasonal allergic rhinitis due to pollen      2. Facial pain      3. Chronic pansinusitis                             Plan:      I recommend:     Functional endoscopic sinus surgery with bilateral maxillary, frontal and sphenoid antrostomies, submucous resection of inferior turbinates, septoplasty     The procedure risks and benefits were discussed with the patient and family. Pt and family understood and decided to proceed with the surgery.         Surgical risks include:    --Injury to the lining of the brain, meningitis, stroke and death - most common in the approach to the frontal sinus but may also occur in operations on the sphenoid and ethmoid sinuses  --Injury to the carotid artery - most common in sphenoid sinus surgery  --Injury to the orbit, eye and eye muscles.   Most commonly the medial rectus muscle is injured, this can cause double vision.  The optic nerve can be injured during sphenoid sinus surgery.   Injury to the Nasolacrimal Duct can cause tearing. -- Bleeding or air in the orbit. If this happens, blindness can occur due to pressure. Relief of the pressure is mandatory to prevent loss of sight.     Septoplasty  (nasal septum and turbinate surgery)        Surgical risks include:    -- Recurrence of the septal deviation with recurrence of the airway obstruction.  The nasal septum is made of cartilage. Cartilage has a memory and sometimes over the course of hours will bend back to the preoperative position. -- Septal Hematoma: This can occur if a drainage hole was not created in the septum. (Note: most surgeons have the problem of too many holes created during surgery, not too few). The cartilage has no blood supply and receives its nutrients from the overlying mucosa. A septal hematoma elevates the mucosal flaps off of the cartilage, resulting in cartilage death and usually infection. If the entire septum is lost the nose may collapse creating a saddle deformity. -- Hole in the nasal septum. This occurs where there are two opposing holes in the nasal septal flaps.  A hole in the nasal septum may result in disturbing crusting, bleeding and whistling.   -- Saddle Nose: If too much supporting cartilage is removed, the mid-portion of the nose may sag creating a \"Saddle Nose\" deformity.

## 2021-03-29 ENCOUNTER — ANESTHESIA (OUTPATIENT)
Dept: OPERATING ROOM | Age: 61
End: 2021-03-29
Payer: OTHER GOVERNMENT

## 2021-03-29 ENCOUNTER — HOSPITAL ENCOUNTER (OUTPATIENT)
Age: 61
Setting detail: OUTPATIENT SURGERY
Discharge: HOME | End: 2021-03-29
Attending: OTOLARYNGOLOGY | Admitting: OTOLARYNGOLOGY
Payer: OTHER GOVERNMENT

## 2021-03-29 VITALS — SYSTOLIC BLOOD PRESSURE: 128 MMHG | DIASTOLIC BLOOD PRESSURE: 70 MMHG | OXYGEN SATURATION: 100 %

## 2021-03-29 VITALS
SYSTOLIC BLOOD PRESSURE: 159 MMHG | DIASTOLIC BLOOD PRESSURE: 79 MMHG | BODY MASS INDEX: 25.91 KG/M2 | OXYGEN SATURATION: 94 % | WEIGHT: 146.2 LBS | TEMPERATURE: 96 F | RESPIRATION RATE: 15 BRPM | HEIGHT: 63 IN | HEART RATE: 77 BPM

## 2021-03-29 DIAGNOSIS — Z01.818 PREOP TESTING: Primary | ICD-10-CM

## 2021-03-29 DIAGNOSIS — Z98.890 S/P FESS (FUNCTIONAL ENDOSCOPIC SINUS SURGERY): ICD-10-CM

## 2021-03-29 PROCEDURE — 3700000001 HC ADD 15 MINUTES (ANESTHESIA): Performed by: OTOLARYNGOLOGY

## 2021-03-29 PROCEDURE — 7100000010 HC PHASE II RECOVERY - FIRST 15 MIN: Performed by: OTOLARYNGOLOGY

## 2021-03-29 PROCEDURE — 88305 TISSUE EXAM BY PATHOLOGIST: CPT

## 2021-03-29 PROCEDURE — 3700000000 HC ANESTHESIA ATTENDED CARE: Performed by: OTOLARYNGOLOGY

## 2021-03-29 PROCEDURE — 6360000002 HC RX W HCPCS

## 2021-03-29 PROCEDURE — 2709999900 HC NON-CHARGEABLE SUPPLY: Performed by: OTOLARYNGOLOGY

## 2021-03-29 PROCEDURE — 6360000002 HC RX W HCPCS: Performed by: ANESTHESIOLOGY

## 2021-03-29 PROCEDURE — 2500000003 HC RX 250 WO HCPCS

## 2021-03-29 PROCEDURE — 6370000000 HC RX 637 (ALT 250 FOR IP): Performed by: OTOLARYNGOLOGY

## 2021-03-29 PROCEDURE — C1894 INTRO/SHEATH, NON-LASER: HCPCS | Performed by: OTOLARYNGOLOGY

## 2021-03-29 PROCEDURE — C1726 CATH, BAL DIL, NON-VASCULAR: HCPCS | Performed by: OTOLARYNGOLOGY

## 2021-03-29 PROCEDURE — 31298 NSL/SINS NDSC SURG FRNT&SPHN: CPT | Performed by: OTOLARYNGOLOGY

## 2021-03-29 PROCEDURE — C2625 STENT, NON-COR, TEM W/DEL SY: HCPCS | Performed by: OTOLARYNGOLOGY

## 2021-03-29 PROCEDURE — 7100000000 HC PACU RECOVERY - FIRST 15 MIN: Performed by: OTOLARYNGOLOGY

## 2021-03-29 PROCEDURE — 88311 DECALCIFY TISSUE: CPT

## 2021-03-29 PROCEDURE — 3600000013 HC SURGERY LEVEL 3 ADDTL 15MIN: Performed by: OTOLARYNGOLOGY

## 2021-03-29 PROCEDURE — 30520 REPAIR OF NASAL SEPTUM: CPT | Performed by: OTOLARYNGOLOGY

## 2021-03-29 PROCEDURE — 31295 NSL/SINS NDSC SURG MAX SINS: CPT | Performed by: OTOLARYNGOLOGY

## 2021-03-29 PROCEDURE — 30140 RESECT INFERIOR TURBINATE: CPT | Performed by: OTOLARYNGOLOGY

## 2021-03-29 PROCEDURE — 2500000003 HC RX 250 WO HCPCS: Performed by: OTOLARYNGOLOGY

## 2021-03-29 PROCEDURE — 6360000002 HC RX W HCPCS: Performed by: NURSE ANESTHETIST, CERTIFIED REGISTERED

## 2021-03-29 PROCEDURE — 7100000011 HC PHASE II RECOVERY - ADDTL 15 MIN: Performed by: OTOLARYNGOLOGY

## 2021-03-29 PROCEDURE — 2580000003 HC RX 258

## 2021-03-29 PROCEDURE — 3600000003 HC SURGERY LEVEL 3 BASE: Performed by: OTOLARYNGOLOGY

## 2021-03-29 PROCEDURE — 7100000001 HC PACU RECOVERY - ADDTL 15 MIN: Performed by: OTOLARYNGOLOGY

## 2021-03-29 PROCEDURE — 2720000010 HC SURG SUPPLY STERILE: Performed by: OTOLARYNGOLOGY

## 2021-03-29 DEVICE — PROPEL CONTOUR SINUS IMPLANT
Type: IMPLANTABLE DEVICE | Site: NOSE | Status: FUNCTIONAL
Brand: PROPEL CONTOUR

## 2021-03-29 DEVICE — PROPEL MINI SINUS IMPLANT
Type: IMPLANTABLE DEVICE | Site: NOSE | Status: FUNCTIONAL
Brand: PROPEL MINI

## 2021-03-29 RX ORDER — ROCURONIUM BROMIDE 10 MG/ML
INJECTION, SOLUTION INTRAVENOUS PRN
Status: DISCONTINUED | OUTPATIENT
Start: 2021-03-29 | End: 2021-03-29 | Stop reason: SDUPTHER

## 2021-03-29 RX ORDER — ONDANSETRON 2 MG/ML
4 INJECTION INTRAMUSCULAR; INTRAVENOUS
Status: COMPLETED | OUTPATIENT
Start: 2021-03-29 | End: 2021-03-29

## 2021-03-29 RX ORDER — NEOSTIGMINE METHYLSULFATE 1 MG/ML
INJECTION, SOLUTION INTRAVENOUS PRN
Status: DISCONTINUED | OUTPATIENT
Start: 2021-03-29 | End: 2021-03-29 | Stop reason: SDUPTHER

## 2021-03-29 RX ORDER — HYDROCODONE BITARTRATE AND ACETAMINOPHEN 5; 325 MG/1; MG/1
1 TABLET ORAL EVERY 4 HOURS PRN
Qty: 18 TABLET | Refills: 0 | Status: SHIPPED | OUTPATIENT
Start: 2021-03-29 | End: 2021-04-01

## 2021-03-29 RX ORDER — GLYCOPYRROLATE 1 MG/5 ML
SYRINGE (ML) INTRAVENOUS PRN
Status: DISCONTINUED | OUTPATIENT
Start: 2021-03-29 | End: 2021-03-29 | Stop reason: SDUPTHER

## 2021-03-29 RX ORDER — DEXAMETHASONE SODIUM PHOSPHATE 4 MG/ML
INJECTION, SOLUTION INTRA-ARTICULAR; INTRALESIONAL; INTRAMUSCULAR; INTRAVENOUS; SOFT TISSUE PRN
Status: DISCONTINUED | OUTPATIENT
Start: 2021-03-29 | End: 2021-03-29 | Stop reason: SDUPTHER

## 2021-03-29 RX ORDER — LIDOCAINE HYDROCHLORIDE 20 MG/ML
INJECTION, SOLUTION EPIDURAL; INFILTRATION; INTRACAUDAL; PERINEURAL PRN
Status: DISCONTINUED | OUTPATIENT
Start: 2021-03-29 | End: 2021-03-29 | Stop reason: SDUPTHER

## 2021-03-29 RX ORDER — DIAPER,BRIEF,INFANT-TODD,DISP
EACH MISCELLANEOUS PRN
Status: DISCONTINUED | OUTPATIENT
Start: 2021-03-29 | End: 2021-03-29 | Stop reason: ALTCHOICE

## 2021-03-29 RX ORDER — FENTANYL CITRATE 50 UG/ML
INJECTION, SOLUTION INTRAMUSCULAR; INTRAVENOUS PRN
Status: DISCONTINUED | OUTPATIENT
Start: 2021-03-29 | End: 2021-03-29 | Stop reason: SDUPTHER

## 2021-03-29 RX ORDER — SODIUM CHLORIDE 0.9 % (FLUSH) 0.9 %
10 SYRINGE (ML) INJECTION PRN
Status: DISCONTINUED | OUTPATIENT
Start: 2021-03-29 | End: 2021-03-29 | Stop reason: HOSPADM

## 2021-03-29 RX ORDER — CEPHALEXIN 500 MG/1
500 CAPSULE ORAL 2 TIMES DAILY
Qty: 10 CAPSULE | Refills: 0 | Status: SHIPPED | OUTPATIENT
Start: 2021-03-29 | End: 2021-04-03

## 2021-03-29 RX ORDER — SODIUM CHLORIDE 9 MG/ML
INJECTION, SOLUTION INTRAVENOUS CONTINUOUS PRN
Status: DISCONTINUED | OUTPATIENT
Start: 2021-03-29 | End: 2021-03-29 | Stop reason: SDUPTHER

## 2021-03-29 RX ORDER — OXYMETAZOLINE HYDROCHLORIDE 0.05 G/100ML
2 SPRAY NASAL
Status: DISCONTINUED | OUTPATIENT
Start: 2021-03-29 | End: 2021-03-29 | Stop reason: HOSPADM

## 2021-03-29 RX ORDER — ONDANSETRON 4 MG/1
4 TABLET, ORALLY DISINTEGRATING ORAL EVERY 8 HOURS PRN
Qty: 15 TABLET | Refills: 0 | Status: SHIPPED | OUTPATIENT
Start: 2021-03-29 | End: 2021-04-03

## 2021-03-29 RX ORDER — ONDANSETRON 2 MG/ML
INJECTION INTRAMUSCULAR; INTRAVENOUS PRN
Status: DISCONTINUED | OUTPATIENT
Start: 2021-03-29 | End: 2021-03-29 | Stop reason: SDUPTHER

## 2021-03-29 RX ORDER — MEPERIDINE HYDROCHLORIDE 25 MG/ML
12.5 INJECTION INTRAMUSCULAR; INTRAVENOUS; SUBCUTANEOUS EVERY 5 MIN PRN
Status: DISCONTINUED | OUTPATIENT
Start: 2021-03-29 | End: 2021-03-29 | Stop reason: HOSPADM

## 2021-03-29 RX ORDER — MIDAZOLAM HYDROCHLORIDE 1 MG/ML
INJECTION INTRAMUSCULAR; INTRAVENOUS PRN
Status: DISCONTINUED | OUTPATIENT
Start: 2021-03-29 | End: 2021-03-29 | Stop reason: SDUPTHER

## 2021-03-29 RX ORDER — LIDOCAINE HYDROCHLORIDE AND EPINEPHRINE 10; 10 MG/ML; UG/ML
INJECTION, SOLUTION INFILTRATION; PERINEURAL PRN
Status: DISCONTINUED | OUTPATIENT
Start: 2021-03-29 | End: 2021-03-29 | Stop reason: ALTCHOICE

## 2021-03-29 RX ORDER — SODIUM CHLORIDE 0.9 % (FLUSH) 0.9 %
10 SYRINGE (ML) INJECTION EVERY 12 HOURS SCHEDULED
Status: DISCONTINUED | OUTPATIENT
Start: 2021-03-29 | End: 2021-03-29 | Stop reason: HOSPADM

## 2021-03-29 RX ORDER — PROPOFOL 10 MG/ML
INJECTION, EMULSION INTRAVENOUS PRN
Status: DISCONTINUED | OUTPATIENT
Start: 2021-03-29 | End: 2021-03-29 | Stop reason: SDUPTHER

## 2021-03-29 RX ADMIN — Medication 3 MG: at 11:04

## 2021-03-29 RX ADMIN — MIDAZOLAM 2 MG: 1 INJECTION INTRAMUSCULAR; INTRAVENOUS at 08:36

## 2021-03-29 RX ADMIN — SODIUM CHLORIDE: 9 INJECTION, SOLUTION INTRAVENOUS at 08:36

## 2021-03-29 RX ADMIN — PHENYLEPHRINE HYDROCHLORIDE 100 MCG: 10 INJECTION INTRAVENOUS at 09:29

## 2021-03-29 RX ADMIN — PHENYLEPHRINE HYDROCHLORIDE 100 MCG: 10 INJECTION INTRAVENOUS at 08:58

## 2021-03-29 RX ADMIN — SODIUM CHLORIDE: 9 INJECTION, SOLUTION INTRAVENOUS at 09:22

## 2021-03-29 RX ADMIN — ONDANSETRON 4 MG: 2 INJECTION INTRAMUSCULAR; INTRAVENOUS at 08:51

## 2021-03-29 RX ADMIN — PHENYLEPHRINE HYDROCHLORIDE 100 MCG: 10 INJECTION INTRAVENOUS at 09:05

## 2021-03-29 RX ADMIN — HYDROMORPHONE HYDROCHLORIDE 0.5 MG: 1 INJECTION, SOLUTION INTRAMUSCULAR; INTRAVENOUS; SUBCUTANEOUS at 11:32

## 2021-03-29 RX ADMIN — ROCURONIUM BROMIDE 15 MG: 10 INJECTION, SOLUTION INTRAVENOUS at 10:36

## 2021-03-29 RX ADMIN — PHENYLEPHRINE HYDROCHLORIDE 100 MCG: 10 INJECTION INTRAVENOUS at 09:10

## 2021-03-29 RX ADMIN — DEXAMETHASONE SODIUM PHOSPHATE 10 MG: 4 INJECTION, SOLUTION INTRAMUSCULAR; INTRAVENOUS at 08:51

## 2021-03-29 RX ADMIN — Medication 0.6 MG: at 11:04

## 2021-03-29 RX ADMIN — ONDANSETRON 4 MG: 2 INJECTION INTRAMUSCULAR; INTRAVENOUS at 12:11

## 2021-03-29 RX ADMIN — LIDOCAINE HYDROCHLORIDE 5 ML: 20 INJECTION, SOLUTION EPIDURAL; INFILTRATION; INTRACAUDAL; PERINEURAL at 08:41

## 2021-03-29 RX ADMIN — PROPOFOL 50 MG: 10 INJECTION, EMULSION INTRAVENOUS at 10:48

## 2021-03-29 RX ADMIN — HYDROMORPHONE HYDROCHLORIDE 0.5 MG: 1 INJECTION, SOLUTION INTRAMUSCULAR; INTRAVENOUS; SUBCUTANEOUS at 11:22

## 2021-03-29 RX ADMIN — ROCURONIUM BROMIDE 50 MG: 10 INJECTION, SOLUTION INTRAVENOUS at 08:41

## 2021-03-29 RX ADMIN — ROCURONIUM BROMIDE 20 MG: 10 INJECTION, SOLUTION INTRAVENOUS at 09:35

## 2021-03-29 RX ADMIN — PROPOFOL 160 MG: 10 INJECTION, EMULSION INTRAVENOUS at 08:41

## 2021-03-29 RX ADMIN — FENTANYL CITRATE 50 MCG: 50 INJECTION, SOLUTION INTRAMUSCULAR; INTRAVENOUS at 11:06

## 2021-03-29 RX ADMIN — PHENYLEPHRINE HYDROCHLORIDE 100 MCG: 10 INJECTION INTRAVENOUS at 09:21

## 2021-03-29 RX ADMIN — FENTANYL CITRATE 50 MCG: 50 INJECTION, SOLUTION INTRAMUSCULAR; INTRAVENOUS at 09:38

## 2021-03-29 RX ADMIN — FENTANYL CITRATE 150 MCG: 50 INJECTION, SOLUTION INTRAMUSCULAR; INTRAVENOUS at 08:41

## 2021-03-29 ASSESSMENT — PULMONARY FUNCTION TESTS
PIF_VALUE: 18
PIF_VALUE: 15
PIF_VALUE: 16
PIF_VALUE: 15
PIF_VALUE: 16
PIF_VALUE: 13
PIF_VALUE: 3
PIF_VALUE: 13
PIF_VALUE: 15
PIF_VALUE: 16
PIF_VALUE: 14
PIF_VALUE: 16
PIF_VALUE: 28
PIF_VALUE: 14
PIF_VALUE: 16
PIF_VALUE: 14
PIF_VALUE: 15
PIF_VALUE: 14
PIF_VALUE: 16
PIF_VALUE: 15
PIF_VALUE: 16
PIF_VALUE: 16
PIF_VALUE: 21
PIF_VALUE: 15
PIF_VALUE: 16
PIF_VALUE: 14
PIF_VALUE: 15
PIF_VALUE: 15
PIF_VALUE: 3
PIF_VALUE: 14
PIF_VALUE: 16
PIF_VALUE: 15
PIF_VALUE: 17
PIF_VALUE: 15
PIF_VALUE: 12
PIF_VALUE: 16
PIF_VALUE: 16
PIF_VALUE: 14
PIF_VALUE: 15
PIF_VALUE: 14
PIF_VALUE: 16
PIF_VALUE: 16
PIF_VALUE: 12
PIF_VALUE: 1
PIF_VALUE: 14
PIF_VALUE: 20
PIF_VALUE: 14
PIF_VALUE: 15
PIF_VALUE: 17
PIF_VALUE: 15
PIF_VALUE: 18
PIF_VALUE: 15
PIF_VALUE: 15
PIF_VALUE: 0
PIF_VALUE: 16
PIF_VALUE: 15
PIF_VALUE: 16
PIF_VALUE: 15
PIF_VALUE: 17
PIF_VALUE: 14
PIF_VALUE: 13
PIF_VALUE: 15
PIF_VALUE: 12
PIF_VALUE: 16
PIF_VALUE: 15
PIF_VALUE: 14
PIF_VALUE: 14
PIF_VALUE: 15
PIF_VALUE: 16
PIF_VALUE: 12
PIF_VALUE: 27
PIF_VALUE: 17
PIF_VALUE: 16
PIF_VALUE: 17
PIF_VALUE: 14
PIF_VALUE: 12
PIF_VALUE: 12
PIF_VALUE: 16
PIF_VALUE: 15
PIF_VALUE: 15
PIF_VALUE: 12
PIF_VALUE: 16
PIF_VALUE: 15
PIF_VALUE: 14

## 2021-03-29 ASSESSMENT — PAIN SCALES - GENERAL
PAINLEVEL_OUTOF10: 7
PAINLEVEL_OUTOF10: 7
PAINLEVEL_OUTOF10: 4
PAINLEVEL_OUTOF10: 7

## 2021-03-29 ASSESSMENT — PAIN DESCRIPTION - DESCRIPTORS
DESCRIPTORS: BURNING
DESCRIPTORS: BURNING
DESCRIPTORS: DISCOMFORT

## 2021-03-29 ASSESSMENT — PAIN DESCRIPTION - LOCATION
LOCATION: THROAT
LOCATION: NOSE
LOCATION: NOSE

## 2021-03-29 ASSESSMENT — PAIN DESCRIPTION - PROGRESSION: CLINICAL_PROGRESSION: GRADUALLY IMPROVING

## 2021-03-29 ASSESSMENT — PAIN DESCRIPTION - PAIN TYPE
TYPE: SURGICAL PAIN

## 2021-03-29 ASSESSMENT — PAIN DESCRIPTION - ORIENTATION: ORIENTATION: MID;INNER

## 2021-03-29 NOTE — OP NOTE
3/29/2021  Joli Angelucci  [id number]      Pre-operative Diagnosis: Chronic sinusitis, deviated nasal septum, enlarged inferior turbinates    Post-operative Diagnosis: same    Procedure: Functional endoscopic sinus surgery with bilateral maxillary, frontal and sphenoid antrostomies, submucous resection of inferior turbinates, septoplasty    Anesthesia: General endotracheal anesthesia    Surgeons/Assistants: Jack Yonug    Estimated Blood Loss: less than 50     Complications: None    Specimens: was Obtained    Indications: This is a 64 y/o female with history of nasal obstruction, chronic sinusitis. Risks benefits and alternatives discussed with patient including but not limited to bleeding infection damage to adjacent structures septal hematoma. Description of Procedure:   Prep  The patient was consented preoperatively and taken back to the operating room, identified appropriately, placed in the supine position, given anesthesia for general intubation. The patient was intubated. They were prepped and draped in a sterile fashion. Initial prep for the nose was 4% cocaine pledgets placed into both nasal cavities and the patient's nasal walls medial and lateral along the septal line and then along the inferior turbinate were injected with approximately 10mL of 1% lidocaine with epinephrine. That was total for both sides. The pledgets were allowed to sit for approximately 5 minutes, while the rest of the patient's prep was done. Septoplasty  Starting on the right side, using a #15 blade, an incision was made in the   junction of the nasal valve on the nasal mucosa. The nasal septum was then   presented out into the field. The incision was cut down to the septal   cartilage and then the perichondrial flap was then elevated on the right side   of the nasal septal cartilage.  Once under the perichondrium, a 30-degree   endoscope was used to complete the elevation of the perichondrial flap as   well as the periosteal flap once reaching the bony cartilaginous junction on   the right side. The bony cartilaginous junction was then disjointed and the   periosteal flap on both sides was also elevated, going back towards to the sphenoid rostrum in an anterior-posterior direction and superior-inferior direction. The flap was elevated as far as possible superiorly, then inferiorly down to the nasal floor on the both sides. A septal knife was then used to cut the septal cartilage inferiorly along the   area of the maxillary crest to create a swinging door effect. The periosteum off the maxillary crest was then elevated. A closed Aleksey-Souza was used to reduce the size of the maxillary crest and some of the bone was removed. At the bony cartilaginous junction, the bone did have a large spur to the left and so the ethmoid bone was then removed posteriorly behind the bony cartilaginous junction, thus reducing the deflection of the bone. Once the appropriate amount of bone was removed and the patient actually had a straight septum on just general nasal endoscopy bilaterally, a Boies elevator was used to lateralize both inferior turbinates. This gave a little bit more room. Injection  The 30 degree endoscope was place into the left nostril along with a 10cc syringe with a spinal needle. The anterior root of the middle turbinate was injected with 2cc of 1% lidocaine with epinephrine. The right anterior root was also injected with the same amount. The posterior root of the middle turbinates were then addressed by injecting both sides with 2cc of 1% lidocain with epinephrine. Sphenoid  LEFT:  Once the patient was properly set up, the pledgets were taken out of the nose and a 30-degree endoscope was placed in the patient's left nasal cavity. Moving from a posterior to anterior position, the first sinus that was addressed was the sphenoid sinus.   The 30-degree endoscope was placed back to the area of the supreme turbinate along the posterior nasopharyngeal wall approximately 1 cm above the nasal choanae on the left side was seen the beginning of the sphenoid sinus. This was somewhat atretic and the sinus was maybe 1 to 2 mm at best. Using the [employer] functional sphenoid guide, the guide was placed along the same path as the endoscope and placed right up to the area where the sphenoid sinus could be seen. The lighted guidewire was then placed into the sphenoid sinus with manipulation. Once it was in the sphenoid sinus, a 6-mm balloon was then allowed to pass over the area of the sphenoid sinus until the balloon straddled both sides of the sinus ostium. The balloon was then taken up to 12 mmHg pressure with water. This was allowed to sit for 3 seconds and was then taken down. The resulting ostium after balloon dilation of the sphenoid sinus was very large and allowed for visualization of the sphenoid sinus itself. This area was irrigated out with 180 ml of Saline. Then suction to remove residual irrigation. The endoscope and balloon were then removed    RIGHT:  The 30-degree endoscope was then placed in the patient's right nasal cavity. Moving from a posterior to anterior position, the first sinus that was addressed was the sphenoid sinus. The 30-degree endoscope was placed back to the area of the supreme turbinate along the posterior nasopharyngeal wall approximately 1 cm above the nasal choanae on the left side was seen the beginning of the sphenoid sinus. This was somewhat atretic and the sinus was maybe 1 to 2 mm at best. Using the [employer]arent functional sphenoid guide, the guide was placed along the same path as the endoscope and placed right up to the area where the sphenoid sinus could be seen. The lighted guidewire was then placed into the sphenoid sinus with manipulation.  Once it was in the sphenoid sinus, a 6-mm balloon was then allowed to pass over the area of the sphenoid sinus until the balloon straddled both were in full view. Injections were placed at the root of the middle turbinate and approximately 1 to 2 mL were placed in the root of the middle turbinate for anesthesia as well as to control bleeding. The frontal sinus guide from [employer] was then used to find the patient's   frontal sinus. The guidewire was used and I was visualize the patients frontal recess with a 70 degree endoscope which noted to have a possible bony structure versus ethmoid air cells obstructing the frontal os. A balloon dilation in the frontal recess was made and gave a bit more access. Multiple attempts were made to pass the guidewire into the frontal os without success. Attempts were made using the [employer]arent lighted balloon as well as [employer] navigated balloon. The balloon was then retracted and the frontal sinus seeker was withdrawn. Maxillary   LEFT:  With the maxillary sinuses, starting on the left side, the patient was not found to have an accessory os. The patient was not also found to have a eliseo bullosa which was not reduced to allow access into the Osteomeatal complex. The maxillary sinus guidewire was used to go to the posterior aspect of the uncinate. The tip of the guidewire was placed just lateral to the uncinate process and the guidewire was advanced. Once the guidewire was placed across the ostium, visualization was found by light in the left maxillary sinus. The balloon was advanced over the ostium and on the inflation to 12 mm of pressure. The balloon did open up the uncinate process as well and the bowing of this uncinate process medially showed that we were in the appropriate position. This was allowed to hold for 3 seconds and then taken down. The patient's sinus was then irrigated out as well with 180 mL of normal saline and then the balloon and guidewire were withdrawn. The maxillary seeker was removed from the nose.      RIGHT:  With the maxillary sinuses, starting on the right side, the patient was not found to have an accessory os. The patient was also found to have a eliseo bullosa which was reduced to allow access into the Osteomeatal complex. The maxillary sinus guidewire was used to go to the posterior aspect of the uncinate. The tip of the guidewire was placed just lateral to the uncinate process and the guidewire was advanced. Once the guidewire was placed across the ostium, visualization was found by light in the right maxillary sinus. The balloon was advanced over the ostium and on the inflation to 12 mm of pressure. The balloon did open up the uncinate process as well and the bowing of this uncinate process medially showed that we were in the appropriate position. This was allowed to hold for 3 seconds and then taken down. The patient's sinus was then irrigated out as well with 180 mL of normal saline and then the balloon and guidewire were withdrawn. The maxillary seeker was removed from the nose. SMRITS  The bilateral inferior turbinates were medialized with a boies elevator. A syringe with saline and a 22 gauge spinal needle was then used to inject the left turbinate mucosa to allow proper coblation. Using an arthrocare Reflex wand, a small puncture hole was made with the instrument in the anterior portion of the left inferior turbinate. The instrument was advanced along the bone of the turbinate, elevating the periostium from the mucosa. The mucosa was then ablated until a thin mucosal layer was left over the bone. Multiple sites were made going from superior to inferior. The turbinate was then lateralized with a boies elevator. Attention was turned to the right side. A syringe with saline and a 22 gauge spinal needle was then used to inject the right turbinate mucosa to allow proper coblationUsing an arthrocare Reflex wand, a small puncture hole was made with the instrument in the anterior portion of the left inferior turbinate.   The instrument was advanced along the bone of the turbinate, elevating the periostium from the mucosa. The mucosa was then ablated until a thin mucosal layer was left over the bone. Multiple sites were made going from superior to inferior. The turbinate was then lateralized again with a boies elevator. Dressing  Bacitracin covered Lindsey splints were placed in bilateral nares and a nylon suture was used to secure the splints through the left side of the nose to the right side and back. The patient has handed back to anesthesia for proper wakening.  The patient tolerated the procedure without complication    Electronically signed by Flaco Person DO on 3/29/2021 at 11:37 AM

## 2021-03-29 NOTE — ANESTHESIA POSTPROCEDURE EVALUATION
Department of Anesthesiology  Postprocedure Note    Patient: Damien Eric  MRN: [id number]  YOB: 1960  Date of evaluation: 3/29/2021  Time:  4:25 PM     Procedure Summary     Date: 03/29/21 Room / Location: [facility] 01 / 106 [facility]    Anesthesia Start: 2046 Anesthesia Stop: 1121    Procedure: FUNCTIONAL ENDOSCOPIC SINUS SURGERY SEPTOPLASTY AND SUBMUCOSAL RESECTION OF INFERIOR TURBINOPLASTY (N/A Nose) Diagnosis: (CHRONIC SINUSITIS DEVIATED NASAL SEPTUM AND TURBINATE HYPERTROPHY)    Surgeons: Estephania Tang DO Responsible Provider: Rosemarie Cherry MD    Anesthesia Type: general ASA Status: 2          Anesthesia Type: general    Mario Phase I: Mario Score: 10    Mario Phase II: Mario Score: 10    Last vitals: Reviewed and per EMR flowsheets.        Anesthesia Post Evaluation    Patient location during evaluation: PACU  Patient participation: complete - patient participated  Level of consciousness: awake and alert  Airway patency: patent  Nausea & Vomiting: no vomiting and no nausea  Complications: no  Cardiovascular status: hemodynamically stable  Respiratory status: acceptable  Hydration status: stable

## 2021-03-29 NOTE — ANESTHESIA PRE PROCEDURE
Department of Anesthesiology  Preprocedure Note       Name:  Linnette Dowell   Age:  63 y.o.  :  1960                                          MRN:  [id number]         Date:  3/29/2021      Surgeon: Bethel Beatty):  Steff May DO    Procedure: Procedure(s):  FUNCTIONAL ENDOSCOPIC SINUS SURGERY SEPTOPLASTYAND SUBMUCOSAL RESECTION OF INFERIOR TURBINOPLASTY    Medications prior to admission:   Prior to Admission medications    Medication Sig Start Date End Date Taking? Authorizing Provider   omeprazole (PRILOSEC) 40 MG delayed release capsule TAKE 1 CAPSULE DAILY  Patient taking differently: daily as needed  21   No Roberson MD   pseudoephedrine (SUDAFED) 30 MG tablet Take 30 mg by mouth every 4 hours as needed for Congestion    Historical Provider, MD   calcium carbonate (OSCAL) 500 MG TABS tablet Take 600 mg by mouth daily     Historical Provider, MD   fluticasone [facility]) 50 MCG/ACT nasal spray 1 spray by Nasal route daily 3/26/19   No Roberson MD   Multiple Vitamins-Minerals (CENTRUM SILVER PO) Take by mouth    Historical Provider, MD   Naproxen Sodium (ALEVE PO) Take by mouth    Historical Provider, MD   Polyethylene Glycol 3350 (MIRALAX PO) Take by mouth as needed    Historical Provider, MD   Magnesium 100 MG CAPS Take 250 mg by mouth daily     Historical Provider, MD   Cholecalciferol 2000 UNITS CAPS Take by mouth    Historical Provider, MD   Inulin (FIBER CHOICE PO) Take by mouth every other day     Historical Provider, MD       Current medications:    No current facility-administered medications for this visit. No current outpatient medications on file.      Facility-Administered Medications Ordered in Other Visits   Medication Dose Route Frequency Provider Last Rate Last Admin    sodium chloride flush 0.9 % injection 10 mL  10 mL Intravenous 2 times per day Gabriela Gentile DO        sodium chloride flush 0.9 % injection 10 mL  10 mL Intravenous PRN Gabriela Gentile DO        oxymetazoline (AFRIN) 0.05 % nasal spray 2 spray  2 spray Each Nostril On Call to Nadir [address], DO           Allergies: Allergies   Allergen Reactions    Benadryl [Diphenhydramine] Anaphylaxis    Minocycline Nausea Only     dizziness    Vicodin [Hydrocodone-Acetaminophen] Nausea And Vomiting       Problem List:    Patient Active Problem List   Diagnosis Code    Hyperlipidemia E78.5    Patient overweight E66.3    Foot pain M79.673    Osteopenia M85.80    Chronic interstitial cystitis N30.10    Tennis elbow M77.10       Past Medical History:        Diagnosis Date    Ascending aorta enlargement ([address]     no current issues- cardiology signed off  (see Epic notes)     Chronic sinusitis     for OR 3-29-21     GERD (gastroesophageal reflux disease)     Headache     Hiatal hernia     Osteoporosis        Past Surgical History:        Procedure Laterality Date    COLONOSCOPY      Dr Maritza Cristobal      Dr. Tabitha Rivera  10/1995    MYOMECTOMY  1996    SHOULDER SURGERY Right 2002    SHOULDER SURGERY Left 2002    TONSILLECTOMY      UPPER GASTROINTESTINAL ENDOSCOPY N/A 2018    EGD BIOPSY performed by Antonio Blackmon MD at [facility] ENDOSCOPY       Social History:    Social History     Tobacco Use    Smoking status: Former Smoker     Packs/day: 1.00     Years: 30.00     Pack years: 30.00     Types: Cigarettes     Quit date: 10/10/2007     Years since quittin.4    Smokeless tobacco: Never Used   Substance Use Topics    Alcohol use: Yes     Frequency: 4 or more times a week     Drinks per session: 1 or 2     Binge frequency: Daily or almost daily     Comment: 2 or 3 beer or wine weekly                                Counseling given: Not Answered      Vital Signs (Current): There were no vitals filed for this visit.                                            BP Readings from Last 3 Encounters:   21 137/89   04/10/20 (!) 153/98   20 (!) 145/89       NPO Status:                                                                                 BMI:   Wt Readings from Last 3 Encounters:   03/22/21 146 lb 3.2 oz (66.3 kg)   02/16/21 146 lb 14.4 oz (66.6 kg)   02/10/21 149 lb (67.6 kg)     There is no height or weight on file to calculate BMI.    CBC:   Lab Results   Component Value Date    WBC 4.4 12/17/2020    RBC 4.82 12/17/2020    HGB 14.4 12/17/2020    HCT 45.8 12/17/2020    MCV 95.0 12/17/2020    RDW 12.9 12/17/2020     12/17/2020       CMP:   Lab Results   Component Value Date     12/17/2020    K 4.5 02/16/2021     12/17/2020    CO2 24 12/17/2020    BUN 11 12/17/2020    CREATININE 0.7 12/17/2020    GFRAA >60 12/17/2020    LABGLOM >60 12/17/2020    GLUCOSE 94 12/17/2020    PROT 7.1 12/17/2020    CALCIUM 9.8 12/17/2020    BILITOT 0.3 12/17/2020    ALKPHOS 79 12/17/2020    AST 22 12/17/2020    ALT 13 12/17/2020       POC Tests: No results for input(s): POCGLU, POCNA, POCK, POCCL, POCBUN, POCHEMO, POCHCT in the last 72 hours. Coags: No results found for: PROTIME, INR, APTT    HCG (If Applicable): No results found for: PREGTESTUR, PREGSERUM, HCG, HCGQUANT     ABGs: No results found for: PHART, PO2ART, KWZ6BBZ, MIA6CGP, BEART, Z2KBWXHY     Type & Screen (If Applicable):  No results found for: LABABO, [address]    Anesthesia Evaluation  Patient summary reviewed no history of anesthetic complications:   Airway: Mallampati: II  TM distance: >3 FB      Dental: normal exam         Pulmonary: breath sounds clear to auscultation                            ROS comment: Former Smoker  rhinitis   Cardiovascular:    (+) hyperlipidemia      ECG reviewed  Rhythm: regular  Rate: normal  Echocardiogram reviewed         Beta Blocker:  Not on Beta Blocker         Neuro/Psych:   (+) headaches:,             GI/Hepatic/Renal:   (+) hiatal hernia, GERD: no interval change,          ROS comment: Chronic interstitial cystitis.    Endo/Other: Negative Endo/Other ROS                    Abdominal:           Vascular: negative vascular ROS. Anesthesia Plan      general     ASA 2       Induction: intravenous. MIPS: Prophylactic antiemetics administered. Anesthetic plan and risks discussed with patient and spouse. Plan discussed with CRNA.                   Norm Howell MD   3/29/2021

## 2021-03-30 ENCOUNTER — TELEPHONE (OUTPATIENT)
Dept: ADMINISTRATIVE | Age: 61
End: 2021-03-30

## 2021-03-30 NOTE — TELEPHONE ENCOUNTER
needs to schedule for a one week follow up from surgery 3/29 to remove stints please call pt at [phone]

## 2021-04-07 ENCOUNTER — OFFICE VISIT (OUTPATIENT)
Dept: ENT CLINIC | Age: 61
End: 2021-04-07

## 2021-04-07 VITALS — WEIGHT: 147 LBS | HEIGHT: 63 IN | TEMPERATURE: 98.1 F | BODY MASS INDEX: 26.05 KG/M2

## 2021-04-07 DIAGNOSIS — J30.1 SEASONAL ALLERGIC RHINITIS DUE TO POLLEN: Primary | ICD-10-CM

## 2021-04-07 DIAGNOSIS — J32.4 CHRONIC PANSINUSITIS: ICD-10-CM

## 2021-04-07 DIAGNOSIS — R51.9 FACIAL PAIN: ICD-10-CM

## 2021-04-07 PROCEDURE — 99024 POSTOP FOLLOW-UP VISIT: CPT | Performed by: OTOLARYNGOLOGY

## 2021-07-27 ENCOUNTER — HOSPITAL ENCOUNTER (OUTPATIENT)
Dept: INFUSION THERAPY | Age: 61
Setting detail: INFUSION SERIES
Discharge: HOME | End: 2021-07-27
Payer: OTHER GOVERNMENT

## 2021-07-27 VITALS
TEMPERATURE: 98.9 F | DIASTOLIC BLOOD PRESSURE: 82 MMHG | HEIGHT: 63 IN | HEART RATE: 76 BPM | WEIGHT: 136 LBS | SYSTOLIC BLOOD PRESSURE: 135 MMHG | RESPIRATION RATE: 16 BRPM | BODY MASS INDEX: 24.1 KG/M2 | OXYGEN SATURATION: 99 %

## 2021-07-27 DIAGNOSIS — M85.80 OSTEOPENIA, UNSPECIFIED LOCATION: Primary | ICD-10-CM

## 2021-07-27 PROCEDURE — 96372 THER/PROPH/DIAG INJ SC/IM: CPT

## 2021-07-27 PROCEDURE — 6360000002 HC RX W HCPCS: Performed by: OBSTETRICS & GYNECOLOGY

## 2021-07-27 RX ADMIN — DENOSUMAB 60 MG: 60 INJECTION SUBCUTANEOUS at 12:55

## 2021-08-11 ENCOUNTER — OFFICE VISIT (OUTPATIENT)
Dept: ENT CLINIC | Age: 61
End: 2021-08-11
Payer: OTHER GOVERNMENT

## 2021-08-11 VITALS
WEIGHT: 135 LBS | TEMPERATURE: 97.5 F | RESPIRATION RATE: 16 BRPM | DIASTOLIC BLOOD PRESSURE: 84 MMHG | HEIGHT: 63 IN | HEART RATE: 68 BPM | SYSTOLIC BLOOD PRESSURE: 134 MMHG | BODY MASS INDEX: 23.92 KG/M2 | OXYGEN SATURATION: 98 %

## 2021-08-11 DIAGNOSIS — J30.1 SEASONAL ALLERGIC RHINITIS DUE TO POLLEN: ICD-10-CM

## 2021-08-11 DIAGNOSIS — J32.4 CHRONIC PANSINUSITIS: ICD-10-CM

## 2021-08-11 DIAGNOSIS — R51.9 FACIAL PAIN: Primary | ICD-10-CM

## 2021-08-11 PROCEDURE — 99213 OFFICE O/P EST LOW 20 MIN: CPT | Performed by: OTOLARYNGOLOGY

## 2021-08-11 ASSESSMENT — ENCOUNTER SYMPTOMS
VOMITING: 0
ALLERGIC/IMMUNOLOGIC NEGATIVE: 1
COUGH: 0
NAUSEA: 0
SINUS PRESSURE: 1
EYE REDNESS: 0
COLOR CHANGE: 0
EYE DISCHARGE: 0
FACIAL SWELLING: 0
SINUS PAIN: 1
STRIDOR: 0
SHORTNESS OF BREATH: 0

## 2021-08-11 NOTE — PROGRESS NOTES
Subjective:      Patient ID:  Annelise Ramon is a 63 y.o. female. HPI:    Pt is returning from FESS surgery 4 month(s) ago. PT is  doing well. PT is  breathing better. Pt is not using Flonase.      Pt is having no headaches    Past Medical History:   Diagnosis Date    Ascending aorta enlargement (HCC)     no current issues- cardiology signed off 2019 (see Epic notes)     Chronic sinusitis     for OR 3-29-21     GERD (gastroesophageal reflux disease)     Headache     Hiatal hernia     Osteoporosis      Past Surgical History:   Procedure Laterality Date    COLONOSCOPY  2013    Dr Guillaume Strickland  2007    Dr. Yang Sears  10/1995    MYOMECTOMY  03/1996    SHOULDER SURGERY Right 07/2002    SHOULDER SURGERY Left 09/2002    SINUS ENDOSCOPY N/A 3/29/2021    FUNCTIONAL ENDOSCOPIC SINUS SURGERY SEPTOPLASTY AND SUBMUCOSAL RESECTION OF INFERIOR TURBINOPLASTY performed by Kassi Mendoza DO at [place] [address] UPPER GASTROINTESTINAL ENDOSCOPY N/A 6/11/2018    EGD BIOPSY performed by Diana Mosley MD at [facility] ENDOSCOPY     Family History   Problem Relation Age of Onset    Diabetes Mother     High Blood Pressure Mother     Other Mother         brain tumor    Heart Disease Father 48        CABG x 4 (twice)    Cancer Father 66        esophageal    Diabetes Father     High Cholesterol Father     Other Sister 54        MS    High Cholesterol Sister     Osteoporosis Sister     High Cholesterol Sister     Mult Sclerosis Sister     Osteoporosis Sister     Other Other         autoimmune disease     Social History     Socioeconomic History    Marital status:      Spouse name: None    Number of children: None    Years of education: None    Highest education level: None   Occupational History    Occupation: unemployed- OFFICE   Tobacco Use    Smoking status: Former Smoker     Packs/day: 1.00     Years: 30.00     Pack years: 30.00     Types: Cigarettes Quit date: 10/10/2007     Years since quittin.8    Smokeless tobacco: Never Used   Vaping Use    Vaping Use: Never used   Substance and Sexual Activity    Alcohol use: Yes     Comment: 2 or 3 beer or wine weekly    Drug use: No    Sexual activity: Yes     Partners: Male   Other Topics Concern    None   Social History Narrative    None     Social Determinants of Health     Financial Resource Strain:     Difficulty of Paying Living Expenses:    Food Insecurity:     Worried About Running Out of Food in the Last Year:     Ran Out of Food in the Last Year:    Transportation Needs:     Lack of Transportation (Medical):  Lack of Transportation (Non-Medical):    Physical Activity:     Days of Exercise per Week:     Minutes of Exercise per Session:    Stress:     Feeling of Stress :    Social Connections:     Frequency of Communication with Friends and Family:     Frequency of Social Gatherings with Friends and Family:     Attends Confucianism Services:     Active Member of Clubs or Organizations:     Attends Club or Organization Meetings:     Marital Status:    Intimate Partner Violence:     Fear of Current or Ex-Partner:     Emotionally Abused:     Physically Abused:     Sexually Abused: Allergies   Allergen Reactions    Benadryl [Diphenhydramine] Anaphylaxis    Minocycline Nausea Only     dizziness    Vicodin [Hydrocodone-Acetaminophen] Nausea And Vomiting       Review of Systems   Constitutional: Negative for chills, fatigue and fever. HENT: Positive for sinus pressure and sinus pain. Negative for ear discharge, ear pain and facial swelling. Eyes: Negative for discharge and redness. Respiratory: Negative for cough, shortness of breath and stridor. Gastrointestinal: Negative for nausea and vomiting. Endocrine: Negative. Genitourinary: Negative. Musculoskeletal: Negative. Skin: Negative for color change and rash. Allergic/Immunologic: Negative.     Neurological: Positive for dizziness. Negative for speech difficulty and headaches. Hematological: Negative. Psychiatric/Behavioral: Negative for agitation and confusion. All other systems reviewed and are negative. Objective:     Vitals:    08/11/21 1409   BP: 134/84   Pulse: 68   Resp: 16   Temp: 97.5 °F (36.4 °C)   SpO2: 98%     Physical Exam  Constitutional:       Appearance: Normal appearance. She is normal weight. HENT:      Head: Normocephalic and atraumatic. Right Ear: Tympanic membrane, ear canal and external ear normal.      Left Ear: Tympanic membrane and external ear normal.      Nose: Nose normal.      Mouth/Throat:      Mouth: Mucous membranes are moist.   Eyes:      Pupils: Pupils are equal, round, and reactive to light. Cardiovascular:      Rate and Rhythm: Normal rate. Pulmonary:      Effort: Pulmonary effort is normal.   Musculoskeletal:         General: Normal range of motion. Cervical back: Normal range of motion. Skin:     General: Skin is warm and dry. Neurological:      General: No focal deficit present. Mental Status: She is alert and oriented to person, place, and time. Assessment:       Diagnosis Orders   1. Facial pain     2. Chronic pansinusitis     3.  Seasonal allergic rhinitis due to pollen                Plan:      Pt is doing really well  Follow up prn

## 2021-08-18 DIAGNOSIS — Z00.00 ANNUAL PHYSICAL EXAM: Primary | ICD-10-CM

## 2021-08-19 ENCOUNTER — NURSE ONLY (OUTPATIENT)
Dept: FAMILY MEDICINE CLINIC | Age: 61
End: 2021-08-19
Payer: OTHER GOVERNMENT

## 2021-08-19 DIAGNOSIS — Z00.00 ANNUAL PHYSICAL EXAM: ICD-10-CM

## 2021-08-19 PROCEDURE — 36415 COLL VENOUS BLD VENIPUNCTURE: CPT | Performed by: FAMILY MEDICINE

## 2021-08-31 ENCOUNTER — OFFICE VISIT (OUTPATIENT)
Dept: FAMILY MEDICINE CLINIC | Age: 61
End: 2021-08-31
Payer: OTHER GOVERNMENT

## 2021-08-31 VITALS
SYSTOLIC BLOOD PRESSURE: 114 MMHG | HEART RATE: 87 BPM | BODY MASS INDEX: 23.48 KG/M2 | RESPIRATION RATE: 16 BRPM | OXYGEN SATURATION: 99 % | WEIGHT: 132.5 LBS | HEIGHT: 63 IN | TEMPERATURE: 97.9 F | DIASTOLIC BLOOD PRESSURE: 75 MMHG

## 2021-08-31 DIAGNOSIS — R63.4 WEIGHT LOSS OBSERVED ON EXAMINATION: ICD-10-CM

## 2021-08-31 DIAGNOSIS — E78.5 HYPERLIPIDEMIA, UNSPECIFIED HYPERLIPIDEMIA TYPE: Primary | ICD-10-CM

## 2021-08-31 PROCEDURE — 99214 OFFICE O/P EST MOD 30 MIN: CPT | Performed by: FAMILY MEDICINE

## 2021-08-31 RX ORDER — ACETAMINOPHEN 325 MG/1
650 TABLET ORAL EVERY 6 HOURS PRN
COMMUNITY

## 2021-08-31 NOTE — PROGRESS NOTES
Hyperlipidemia:  Patient is here to follow up regarding chronic hyperlipidemia. This is not generally controlled. Treatment includes diet. Patient is not compliant with lifestyle modifications. Patient is not a smoker. Most recent labs reviewed with patient today and are remarkable. Comorbid conditions include none. The 10-year ASCVD risk score (Erum Moya, et al., 2013) is: 2%    Values used to calculate the score:      Age: 63 years      Sex: Female      Is Non- : No      Diabetic: No      Tobacco smoker: No      Systolic Blood Pressure: 591 mmHg      Is BP treated: No      HDL Cholesterol: 96 mg/dL      Total Cholesterol: 272 mg/dL      Lab Results   Component Value Date    LDLCALC 168 (H) 08/19/2021       Patient's past medical, surgical, social and/or family history reviewed, updated in chart, and are non-contributory (unless otherwise stated). Medications and allergies also reviewed and updated in chart.       Review of Systems:  Constitutional:  No fever, no fatigue, no chills, no headaches, no weight change  Dermatology:  No rash, no mole, no dry or sensitive skin  ENT:  No cough, no sore throat, no sinus pain, no runny nose, no ear pain  Cardiology:  No chest pain, no palpitations, no leg edema, no shortness of breath, no PND  Gastroenterology:  No dysphagia, no abdominal pain, no nausea, no vomiting, no constipation, no diarrhea, no heartburn  Musculoskeletal:  No joint pain, no leg cramps, no back pain, no muscle aches  Respiratory:  No shortness of breath, no orthopnea, no wheezing, no CAMARGO, no hemoptysis  Urology:  No blood in the urine, no urinary frequency, no urinary incontinence, no urinary urgency, no nocturia, no dysuria  Vitals:    08/31/21 1351   BP: 114/75   Pulse: 87   Resp: 16   Temp: 97.9 °F (36.6 °C)   TempSrc: Temporal   SpO2: 99%   Weight: 132 lb 8 oz (60.1 kg)   Height: 5' 2.5\" (1.588 m)       General:  Patient alert and oriented x 3, NAD, pleasant  HEENT:  Atraumatic, normocephalic, PERRLA, EOMI, clear conjunctiva, TMs clear, nose-clear, throat - no erythema  Neck:  Supple, no goiter, no carotid bruits, no lymphadenopathy  Lungs:  CTA B  Heart:  RRR, no murmurs, gallops or rubs  Abdomen:  Soft/nt/nd, + bowel sounds  Extremities:  No clubbing, cyanosis or edema  Skin: unremarkable    Assessment/Plan:      Tressa Kunz was seen today for hyperlipidemia and discuss labs. Diagnoses and all orders for this visit:    Hyperlipidemia, unspecified hyperlipidemia type  -     Comprehensive Metabolic Panel; Future  -     CBC Auto Differential; Future  -     Lipid Panel; Future    Weight loss observed on examination      As above. Call or go to ED immediately if symptoms worsen or persist.  Return in about 6 months (around 2/28/2022). , or sooner if necessary. Educational materials and/or home exercises printed for patient's review and were included in patient instructions on his/her After Visit Summary and given to patient at the end of visit. Counseled regarding above diagnosis, including possible risks and complications,  especially if left uncontrolled. Counseled regarding the possible side effects, risks, benefits and alternatives to treatment; patient and/or guardian verbalizes understanding, agrees, feels comfortable with and wishes to proceed with above treatment plan. Advised patient to call with any new medication issues, and read all Rx info from pharmacy to assure aware of all possible risks and side effects of medication before taking. Reviewed age and gender appropriate health screening exams and vaccinations. Advised patient regarding importance of keeping up with recommended health maintenance and to schedule as soon as possible if overdue, as this is important in assessing for undiagnosed pathology, especially cancer, as well as protecting against potentially harmful/life threatening disease.         Patient and/or guardian verbalizes understanding and agrees with above counseling, assessment and plan. All questions answered. Igor Apple MD  9/4/2021    I have personally reviewed and updated the chief complaint, HPI, Past Medical, Family and Social History, as well as the above Review of Systems.

## 2021-09-29 ENCOUNTER — NURSE ONLY (OUTPATIENT)
Dept: FAMILY MEDICINE CLINIC | Age: 61
End: 2021-09-29
Payer: OTHER GOVERNMENT

## 2021-09-29 PROCEDURE — 90674 CCIIV4 VAC NO PRSV 0.5 ML IM: CPT | Performed by: FAMILY MEDICINE

## 2021-09-29 PROCEDURE — 90471 IMMUNIZATION ADMIN: CPT | Performed by: FAMILY MEDICINE

## 2021-12-02 ENCOUNTER — PATIENT MESSAGE (OUTPATIENT)
Dept: FAMILY MEDICINE CLINIC | Age: 61
End: 2021-12-02

## 2021-12-02 NOTE — TELEPHONE ENCOUNTER
From: Isra Vail  To: Dr. Shanell Ruvalcaba: 12/2/2021 10:16 AM EST  Subject: Non-Urgent Medical Question    Please update that I received my Covid booster on 12/1/21. I received the Moderna at [facility] on [address] Let me know if you need any additional information.  Thank Zackeyr Nuñez

## 2021-12-16 ENCOUNTER — NURSE ONLY (OUTPATIENT)
Dept: FAMILY MEDICINE CLINIC | Age: 61
End: 2021-12-16
Payer: OTHER GOVERNMENT

## 2021-12-16 DIAGNOSIS — R30.0 DYSURIA: ICD-10-CM

## 2021-12-16 DIAGNOSIS — R30.0 DYSURIA: Primary | ICD-10-CM

## 2021-12-16 LAB
BILIRUBIN, POC: NORMAL
BLOOD URINE, POC: NORMAL
CLARITY, POC: CLEAR
COLOR, POC: YELLOW
GLUCOSE URINE, POC: NORMAL
KETONES, POC: NORMAL
LEUKOCYTE EST, POC: NORMAL
NITRITE, POC: NORMAL
PH, POC: 7
PROTEIN, POC: NORMAL
SPECIFIC GRAVITY, POC: 1.01
UROBILINOGEN, POC: NORMAL

## 2021-12-16 PROCEDURE — 81002 URINALYSIS NONAUTO W/O SCOPE: CPT | Performed by: FAMILY MEDICINE

## 2021-12-16 RX ORDER — CIPROFLOXACIN 500 MG/1
500 TABLET, FILM COATED ORAL 2 TIMES DAILY
Qty: 10 TABLET | Refills: 0 | Status: SHIPPED | OUTPATIENT
Start: 2021-12-16 | End: 2021-12-21

## 2021-12-19 LAB — URINE CULTURE, ROUTINE: NORMAL

## 2022-01-11 ENCOUNTER — TELEPHONE (OUTPATIENT)
Dept: INFUSION THERAPY | Age: 62
End: 2022-01-11

## 2022-01-11 NOTE — TELEPHONE ENCOUNTER
Patient called and cancelled 1-27-22 Prolia injection she is having issues with bill not being paid and other medical issues. Patient discussed everything with Dr. Dima Yoo and they decided to stop the Prolia at this time.   Electronically signed by Arsenio Ford on 1/11/2022 at 1:24 PM

## 2022-02-10 DIAGNOSIS — E78.5 HYPERLIPIDEMIA, UNSPECIFIED HYPERLIPIDEMIA TYPE: ICD-10-CM

## 2022-02-10 LAB
ALBUMIN SERPL-MCNC: 4.4 G/DL (ref 3.5–5.2)
ALP BLD-CCNC: 43 U/L (ref 35–104)
ALT SERPL-CCNC: 22 U/L (ref 0–32)
ANION GAP SERPL CALCULATED.3IONS-SCNC: 16 MMOL/L (ref 7–16)
AST SERPL-CCNC: 26 U/L (ref 0–31)
BASOPHILS ABSOLUTE: 0.04 E9/L (ref 0–0.2)
BASOPHILS RELATIVE PERCENT: 1 % (ref 0–2)
BILIRUB SERPL-MCNC: 0.4 MG/DL (ref 0–1.2)
BUN BLDV-MCNC: 15 MG/DL (ref 6–20)
CALCIUM SERPL-MCNC: 9 MG/DL (ref 8.6–10.2)
CHLORIDE BLD-SCNC: 99 MMOL/L (ref 98–107)
CHOLESTEROL, TOTAL: 270 MG/DL (ref 0–199)
CO2: 23 MMOL/L (ref 22–29)
CREAT SERPL-MCNC: 0.7 MG/DL (ref 0.5–1)
EOSINOPHILS ABSOLUTE: 0.06 E9/L (ref 0.05–0.5)
EOSINOPHILS RELATIVE PERCENT: 1.5 % (ref 0–6)
GFR AFRICAN AMERICAN: >60
GFR NON-AFRICAN AMERICAN: >60 ML/MIN/1.73
GLUCOSE BLD-MCNC: 93 MG/DL (ref 74–99)
HCT VFR BLD CALC: 43.9 % (ref 34–48)
HDLC SERPL-MCNC: 100 MG/DL
HEMOGLOBIN: 14.3 G/DL (ref 11.5–15.5)
IMMATURE GRANULOCYTES #: 0.01 E9/L
IMMATURE GRANULOCYTES %: 0.2 % (ref 0–5)
LDL CHOLESTEROL CALCULATED: 161 MG/DL (ref 0–99)
LYMPHOCYTES ABSOLUTE: 2.14 E9/L (ref 1.5–4)
LYMPHOCYTES RELATIVE PERCENT: 51.8 % (ref 20–42)
MCH RBC QN AUTO: 30.6 PG (ref 26–35)
MCHC RBC AUTO-ENTMCNC: 32.6 % (ref 32–34.5)
MCV RBC AUTO: 94 FL (ref 80–99.9)
MONOCYTES ABSOLUTE: 0.39 E9/L (ref 0.1–0.95)
MONOCYTES RELATIVE PERCENT: 9.4 % (ref 2–12)
NEUTROPHILS ABSOLUTE: 1.49 E9/L (ref 1.8–7.3)
NEUTROPHILS RELATIVE PERCENT: 36.1 % (ref 43–80)
PDW BLD-RTO: 12.5 FL (ref 11.5–15)
PLATELET # BLD: 246 E9/L (ref 130–450)
PMV BLD AUTO: 10.4 FL (ref 7–12)
POTASSIUM SERPL-SCNC: 4 MMOL/L (ref 3.5–5)
RBC # BLD: 4.67 E12/L (ref 3.5–5.5)
SODIUM BLD-SCNC: 138 MMOL/L (ref 132–146)
TOTAL PROTEIN: 7 G/DL (ref 6.4–8.3)
TRIGL SERPL-MCNC: 44 MG/DL (ref 0–149)
VLDLC SERPL CALC-MCNC: 9 MG/DL
WBC # BLD: 4.1 E9/L (ref 4.5–11.5)

## 2022-02-16 ENCOUNTER — OFFICE VISIT (OUTPATIENT)
Dept: FAMILY MEDICINE CLINIC | Age: 62
End: 2022-02-16
Payer: OTHER GOVERNMENT

## 2022-02-16 VITALS
WEIGHT: 135.9 LBS | TEMPERATURE: 97.4 F | HEART RATE: 93 BPM | OXYGEN SATURATION: 100 % | SYSTOLIC BLOOD PRESSURE: 138 MMHG | HEIGHT: 63 IN | DIASTOLIC BLOOD PRESSURE: 83 MMHG | RESPIRATION RATE: 16 BRPM | BODY MASS INDEX: 24.08 KG/M2

## 2022-02-16 DIAGNOSIS — E78.5 HYPERLIPIDEMIA, UNSPECIFIED HYPERLIPIDEMIA TYPE: Primary | ICD-10-CM

## 2022-02-16 PROCEDURE — 99213 OFFICE O/P EST LOW 20 MIN: CPT | Performed by: FAMILY MEDICINE

## 2022-02-16 RX ORDER — CYANOCOBALAMIN (VITAMIN B-12) 1000 MCG
1 TABLET, EXTENDED RELEASE ORAL
COMMUNITY

## 2022-02-16 SDOH — ECONOMIC STABILITY: FOOD INSECURITY: WITHIN THE PAST 12 MONTHS, THE FOOD YOU BOUGHT JUST DIDN'T LAST AND YOU DIDN'T HAVE MONEY TO GET MORE.: NEVER TRUE

## 2022-02-16 SDOH — ECONOMIC STABILITY: FOOD INSECURITY: WITHIN THE PAST 12 MONTHS, YOU WORRIED THAT YOUR FOOD WOULD RUN OUT BEFORE YOU GOT MONEY TO BUY MORE.: NEVER TRUE

## 2022-02-16 ASSESSMENT — SOCIAL DETERMINANTS OF HEALTH (SDOH): HOW HARD IS IT FOR YOU TO PAY FOR THE VERY BASICS LIKE FOOD, HOUSING, MEDICAL CARE, AND HEATING?: NOT HARD AT ALL

## 2022-02-16 NOTE — PROGRESS NOTES
Hyperlipidemia:  Patient is here to follow up regarding chronic hyperlipidemia. This is not generally controlled. Treatment includes diet. Patient is  compliant with lifestyle modifications. Patient is not a smoker. Most recent labs reviewed with patient today and are remarkable. Comorbid conditions include none. The 10-year ASCVD risk score (Paras Grant, et al., 2013) is: 2.9%    Values used to calculate the score:      Age: 63 years      Sex: Female      Is Non- : No      Diabetic: No      Tobacco smoker: No      Systolic Blood Pressure: 672 mmHg      Is BP treated: No      HDL Cholesterol: 100 mg/dL      Total Cholesterol: 270 mg/dL      Lab Results   Component Value Date    LDLCALC 161 (H) 02/10/2022       Patient's past medical, surgical, social and/or family history reviewed, updated in chart, and are non-contributory (unless otherwise stated). Medications and allergies also reviewed and updated in chart.       Review of Systems:  Constitutional:  No fever, no fatigue, no chills, no headaches, no weight change  Dermatology:  No rash, no mole, no dry or sensitive skin  ENT:  No cough, no sore throat, no sinus pain, no runny nose, no ear pain  Cardiology:  No chest pain, no palpitations, no leg edema, no shortness of breath, no PND  Gastroenterology:  No dysphagia, no abdominal pain, no nausea, no vomiting, no constipation, no diarrhea, no heartburn  Musculoskeletal:  No joint pain, no leg cramps, no back pain, no muscle aches  Respiratory:  No shortness of breath, no orthopnea, no wheezing, no CAMARGO, no hemoptysis  Urology:  No blood in the urine, no urinary frequency, no urinary incontinence, no urinary urgency, no nocturia, no dysuria  Vitals:    02/16/22 1316   BP: 138/83   Pulse: 93   Resp: 16   Temp: 97.4 °F (36.3 °C)   TempSrc: Temporal   SpO2: 100%   Weight: 135 lb 14.4 oz (61.6 kg)   Height: 5' 2.5\" (1.588 m)       General:  Patient alert and oriented x 3, NAD, pleasant  HEENT:  Atraumatic, normocephalic, PERRLA, EOMI, clear conjunctiva, TMs clear, nose-clear, throat - no erythema  Neck:  Supple, no goiter, no carotid bruits, no lymphadenopathy  Lungs:  CTA B  Heart:  RRR, no murmurs, gallops or rubs  Abdomen:  Soft/nt/nd, + bowel sounds  Extremities:  No clubbing, cyanosis or edema  Skin: unremarkable    Assessment/Plan:      Blair Nugent was seen today for hyperlipidemia and discuss labs. Diagnoses and all orders for this visit:    Hyperlipidemia, unspecified hyperlipidemia type  -     Comprehensive Metabolic Panel; Future  -     CBC; Future  -     Lipid Panel; Future  -     TSH; Future      As above. Call or go to ED immediately if symptoms worsen or persist.  No follow-ups on file. , or sooner if necessary. Educational materials and/or home exercises printed for patient's review and were included in patient instructions on his/her After Visit Summary and given to patient at the end of visit. Counseled regarding above diagnosis, including possible risks and complications,  especially if left uncontrolled. Counseled regarding the possible side effects, risks, benefits and alternatives to treatment; patient and/or guardian verbalizes understanding, agrees, feels comfortable with and wishes to proceed with above treatment plan. Advised patient to call with any new medication issues, and read all Rx info from pharmacy to assure aware of all possible risks and side effects of medication before taking. Reviewed age and gender appropriate health screening exams and vaccinations. Advised patient regarding importance of keeping up with recommended health maintenance and to schedule as soon as possible if overdue, as this is important in assessing for undiagnosed pathology, especially cancer, as well as protecting against potentially harmful/life threatening disease.         Patient and/or guardian verbalizes understanding and agrees with above counseling, assessment and plan. All questions answered. Kvng Lainez MD  2/16/2022    I have personally reviewed and updated the chief complaint, HPI, Past Medical, Family and Social History, as well as the above Review of Systems.

## 2022-05-06 LAB — MAMMOGRAPHY, EXTERNAL: NORMAL

## 2022-07-15 ENCOUNTER — HOSPITAL ENCOUNTER (OUTPATIENT)
Dept: INFUSION THERAPY | Age: 62
Setting detail: INFUSION SERIES
Discharge: HOME | End: 2022-07-15

## 2022-07-15 VITALS
SYSTOLIC BLOOD PRESSURE: 141 MMHG | DIASTOLIC BLOOD PRESSURE: 92 MMHG | WEIGHT: 138 LBS | TEMPERATURE: 97.6 F | HEIGHT: 63 IN | BODY MASS INDEX: 24.45 KG/M2 | OXYGEN SATURATION: 99 % | HEART RATE: 83 BPM | RESPIRATION RATE: 16 BRPM

## 2022-07-15 ASSESSMENT — PAIN DESCRIPTION - PAIN TYPE: TYPE: CHRONIC PAIN

## 2022-07-15 ASSESSMENT — PAIN SCALES - GENERAL: PAINLEVEL_OUTOF10: 6

## 2022-07-15 ASSESSMENT — PAIN DESCRIPTION - ORIENTATION: ORIENTATION: RIGHT;LEFT

## 2022-07-15 ASSESSMENT — PAIN DESCRIPTION - DESCRIPTORS: DESCRIPTORS: ACHING

## 2022-07-15 ASSESSMENT — PAIN DESCRIPTION - FREQUENCY: FREQUENCY: CONTINUOUS

## 2022-07-15 ASSESSMENT — PAIN DESCRIPTION - LOCATION: LOCATION: FOOT

## 2022-07-15 NOTE — PROGRESS NOTES
Patient arrived for her prolia injection. No calcium level noted in computer or on chart. Patient states she had them done on 7/06/2023 at Dr. Agnieszka Hagan office. Tried to call the office and the office was closed. Patient states she will reschedule.

## 2022-07-22 ENCOUNTER — HOSPITAL ENCOUNTER (OUTPATIENT)
Dept: INFUSION THERAPY | Age: 62
Setting detail: INFUSION SERIES
Discharge: HOME | End: 2022-07-22
Payer: OTHER GOVERNMENT

## 2022-07-22 VITALS
OXYGEN SATURATION: 100 % | TEMPERATURE: 98 F | SYSTOLIC BLOOD PRESSURE: 139 MMHG | RESPIRATION RATE: 18 BRPM | DIASTOLIC BLOOD PRESSURE: 78 MMHG | HEART RATE: 73 BPM

## 2022-07-22 DIAGNOSIS — M85.80 OSTEOPENIA, UNSPECIFIED LOCATION: Primary | ICD-10-CM

## 2022-07-22 PROCEDURE — 96372 THER/PROPH/DIAG INJ SC/IM: CPT

## 2022-07-22 PROCEDURE — 6360000002 HC RX W HCPCS: Performed by: OBSTETRICS & GYNECOLOGY

## 2022-07-22 RX ADMIN — DENOSUMAB 60 MG: 60 INJECTION SUBCUTANEOUS at 13:08

## 2022-08-11 DIAGNOSIS — E78.5 HYPERLIPIDEMIA, UNSPECIFIED HYPERLIPIDEMIA TYPE: ICD-10-CM

## 2022-08-11 LAB
ALBUMIN SERPL-MCNC: 4.4 G/DL (ref 3.5–5.2)
ALP BLD-CCNC: 58 U/L (ref 35–104)
ALT SERPL-CCNC: 11 U/L (ref 0–32)
ANION GAP SERPL CALCULATED.3IONS-SCNC: 14 MMOL/L (ref 7–16)
AST SERPL-CCNC: 17 U/L (ref 0–31)
BILIRUB SERPL-MCNC: <0.2 MG/DL (ref 0–1.2)
BUN BLDV-MCNC: 11 MG/DL (ref 6–23)
CALCIUM SERPL-MCNC: 9.3 MG/DL (ref 8.6–10.2)
CHLORIDE BLD-SCNC: 105 MMOL/L (ref 98–107)
CHOLESTEROL, TOTAL: 246 MG/DL (ref 0–199)
CO2: 23 MMOL/L (ref 22–29)
CREAT SERPL-MCNC: 0.7 MG/DL (ref 0.5–1)
GFR AFRICAN AMERICAN: >60
GFR NON-AFRICAN AMERICAN: >60 ML/MIN/1.73
GLUCOSE BLD-MCNC: 106 MG/DL (ref 74–99)
HCT VFR BLD CALC: 40.6 % (ref 34–48)
HDLC SERPL-MCNC: 89 MG/DL
HEMOGLOBIN: 13.3 G/DL (ref 11.5–15.5)
LDL CHOLESTEROL CALCULATED: 146 MG/DL (ref 0–99)
MCH RBC QN AUTO: 30.6 PG (ref 26–35)
MCHC RBC AUTO-ENTMCNC: 32.8 % (ref 32–34.5)
MCV RBC AUTO: 93.3 FL (ref 80–99.9)
PDW BLD-RTO: 12.5 FL (ref 11.5–15)
PLATELET # BLD: 235 E9/L (ref 130–450)
PMV BLD AUTO: 10.5 FL (ref 7–12)
POTASSIUM SERPL-SCNC: 4.2 MMOL/L (ref 3.5–5)
RBC # BLD: 4.35 E12/L (ref 3.5–5.5)
SODIUM BLD-SCNC: 142 MMOL/L (ref 132–146)
TOTAL PROTEIN: 6.6 G/DL (ref 6.4–8.3)
TRIGL SERPL-MCNC: 53 MG/DL (ref 0–149)
TSH SERPL DL<=0.05 MIU/L-ACNC: 3.45 UIU/ML (ref 0.27–4.2)
VLDLC SERPL CALC-MCNC: 11 MG/DL
WBC # BLD: 4 E9/L (ref 4.5–11.5)

## 2022-08-17 ENCOUNTER — OFFICE VISIT (OUTPATIENT)
Dept: FAMILY MEDICINE CLINIC | Age: 62
End: 2022-08-17
Payer: OTHER GOVERNMENT

## 2022-08-17 VITALS
TEMPERATURE: 97.2 F | SYSTOLIC BLOOD PRESSURE: 126 MMHG | DIASTOLIC BLOOD PRESSURE: 82 MMHG | OXYGEN SATURATION: 99 % | HEIGHT: 63 IN | RESPIRATION RATE: 16 BRPM | BODY MASS INDEX: 23.78 KG/M2 | HEART RATE: 77 BPM | WEIGHT: 134.2 LBS

## 2022-08-17 DIAGNOSIS — E78.5 HYPERLIPIDEMIA, UNSPECIFIED HYPERLIPIDEMIA TYPE: ICD-10-CM

## 2022-08-17 DIAGNOSIS — Z00.00 ANNUAL PHYSICAL EXAM: Primary | ICD-10-CM

## 2022-08-17 PROCEDURE — 99396 PREV VISIT EST AGE 40-64: CPT | Performed by: FAMILY MEDICINE

## 2022-08-17 RX ORDER — AZELASTINE HYDROCHLORIDE 0.5 MG/ML
SOLUTION/ DROPS OPHTHALMIC
COMMUNITY
Start: 2022-07-11

## 2022-08-17 NOTE — PROGRESS NOTES
Hyperlipidemia:  Patient is here to follow up regarding chronic hyperlipidemia. This is  generally controlled. Treatment includes diet controlled. Patient is not compliant with lifestyle modifications. Patient is not a smoker. Most recent labs reviewed with patient today and are remarkable. Comorbid conditions include none. Patient wants to know about the flu shot, COVID protocol, if she needs to take a multi-vitamin and complains of plantar fascitis and wants to know if anything can be prescribed for that. Lab Results   Component Value Date    LDLCALC 146 (H) 08/11/2022       Patient's past medical, surgical, social and/or family history reviewed, updated in chart, and are non-contributory (unless otherwise stated). Medications and allergies also reviewed and updated in chart.       Review of Systems:  Constitutional:  No fever, no fatigue, no chills, no headaches, no weight change  Dermatology:  No rash, no mole, no dry or sensitive skin  ENT:  No cough, no sore throat, no sinus pain, no runny nose, no ear pain  Cardiology:  No chest pain, no palpitations, no leg edema, no shortness of breath, no PND  Gastroenterology:  No dysphagia, no abdominal pain, no nausea, no vomiting, no constipation, no diarrhea, no heartburn  Musculoskeletal:  No joint pain, no leg cramps, no back pain, no muscle aches  Respiratory:  No shortness of breath, no orthopnea, no wheezing, no CAMARGO, no hemoptysis  Urology:  No blood in the urine, no urinary frequency, no urinary incontinence, no urinary urgency, no nocturia, no dysuria  Vitals:    08/17/22 1311   BP: 126/82   Pulse: 77   Resp: 16   Temp: 97.2 °F (36.2 °C)   SpO2: 99%   Weight: 134 lb 3.2 oz (60.9 kg)   Height: 5' 2.5\" (1.588 m)       General:  Patient alert and oriented x 3, NAD, pleasant  HEENT:  Atraumatic, normocephalic, PERRLA, EOMI, clear conjunctiva, TMs clear, nose-clear, throat - no erythema  Neck:  Supple, no goiter, no carotid bruits, no lymphadenopathy  Lungs:  CTA B  Heart:  RRR, no murmurs, gallops or rubs  Abdomen:  Soft/nt/nd, + bowel sounds  Extremities:  No clubbing, cyanosis or edema  Skin: unremarkable    Assessment/Plan:      Rob Gray was seen today for annual exam.    Diagnoses and all orders for this visit:    Annual physical exam    Hyperlipidemia, unspecified hyperlipidemia type    As above. Call or go to ED immediately if symptoms worsen or persist.  No follow-ups on file. , or sooner if necessary. Educational materials and/or home exercises printed for patient's review and were included in patient instructions on his/her After Visit Summary and given to patient at the end of visit. Counseled regarding above diagnosis, including possible risks and complications,  especially if left uncontrolled. Counseled regarding the possible side effects, risks, benefits and alternatives to treatment; patient and/or guardian verbalizes understanding, agrees, feels comfortable with and wishes to proceed with above treatment plan. Advised patient to call with any new medication issues, and read all Rx info from pharmacy to assure aware of all possible risks and side effects of medication before taking. Reviewed age and gender appropriate health screening exams and vaccinations. Advised patient regarding importance of keeping up with recommended health maintenance and to schedule as soon as possible if overdue, as this is important in assessing for undiagnosed pathology, especially cancer, as well as protecting against potentially harmful/life threatening disease. Patient and/or guardian verbalizes understanding and agrees with above counseling, assessment and plan. All questions answered. Mandy Moise MD  8/17/2022    I have personally reviewed and updated the chief complaint, HPI, Past Medical, Family and Social History, as well as the above Review of Systems.

## 2022-10-06 ENCOUNTER — NURSE ONLY (OUTPATIENT)
Dept: FAMILY MEDICINE CLINIC | Age: 62
End: 2022-10-06
Payer: OTHER GOVERNMENT

## 2022-10-06 DIAGNOSIS — Z23 FLU VACCINE NEED: Primary | ICD-10-CM

## 2022-10-06 PROCEDURE — 90674 CCIIV4 VAC NO PRSV 0.5 ML IM: CPT | Performed by: FAMILY MEDICINE

## 2022-10-06 PROCEDURE — 90471 IMMUNIZATION ADMIN: CPT | Performed by: FAMILY MEDICINE

## 2022-10-25 ENCOUNTER — OFFICE VISIT (OUTPATIENT)
Dept: PODIATRY | Age: 62
End: 2022-10-25
Payer: OTHER GOVERNMENT

## 2022-10-25 VITALS
WEIGHT: 134 LBS | BODY MASS INDEX: 24.12 KG/M2 | TEMPERATURE: 98.2 F | DIASTOLIC BLOOD PRESSURE: 78 MMHG | SYSTOLIC BLOOD PRESSURE: 126 MMHG

## 2022-10-25 DIAGNOSIS — M79.674 PAIN IN TOE OF RIGHT FOOT: ICD-10-CM

## 2022-10-25 DIAGNOSIS — M72.2 PLANTAR FASCIAL FIBROMATOSIS: Primary | ICD-10-CM

## 2022-10-25 PROCEDURE — 20550 NJX 1 TENDON SHEATH/LIGAMENT: CPT | Performed by: PODIATRIST

## 2022-10-25 PROCEDURE — 99203 OFFICE O/P NEW LOW 30 MIN: CPT | Performed by: PODIATRIST

## 2022-10-25 RX ORDER — BUPIVACAINE HYDROCHLORIDE 5 MG/ML
1 INJECTION, SOLUTION PERINEURAL ONCE
Status: COMPLETED | OUTPATIENT
Start: 2022-10-25 | End: 2022-10-25

## 2022-10-25 RX ORDER — BETAMETHASONE SODIUM PHOSPHATE AND BETAMETHASONE ACETATE 3; 3 MG/ML; MG/ML
4 INJECTION, SUSPENSION INTRA-ARTICULAR; INTRALESIONAL; INTRAMUSCULAR; SOFT TISSUE ONCE
Status: COMPLETED | OUTPATIENT
Start: 2022-10-25 | End: 2022-10-25

## 2022-10-25 RX ORDER — ETODOLAC 400 MG/1
400 TABLET, FILM COATED ORAL 2 TIMES DAILY
Qty: 180 TABLET | Refills: 1 | Status: SHIPPED
Start: 2022-10-25 | End: 2022-12-01 | Stop reason: ALTCHOICE

## 2022-10-25 RX ADMIN — BETAMETHASONE SODIUM PHOSPHATE AND BETAMETHASONE ACETATE 4 MG: 3; 3 INJECTION, SUSPENSION INTRA-ARTICULAR; INTRALESIONAL; INTRAMUSCULAR; SOFT TISSUE at 11:40

## 2022-10-25 RX ADMIN — BETAMETHASONE SODIUM PHOSPHATE AND BETAMETHASONE ACETATE 4 MG: 3; 3 INJECTION, SUSPENSION INTRA-ARTICULAR; INTRALESIONAL; INTRAMUSCULAR; SOFT TISSUE at 12:53

## 2022-10-25 RX ADMIN — BUPIVACAINE HYDROCHLORIDE 5 MG: 5 INJECTION, SOLUTION PERINEURAL at 12:53

## 2022-10-25 RX ADMIN — BUPIVACAINE HYDROCHLORIDE 5 MG: 5 INJECTION, SOLUTION PERINEURAL at 11:41

## 2022-10-25 NOTE — PROGRESS NOTES
[address] [facility] PODIATRY  [address]  [place] [facility] [address]  Dept: [phone]  Dept Fax: [phone]  Loc: [phone]    NEW PATIENT PROGRESS NOTE  Date of patient's visit: 10/25/2022  Patient's Name:  Lester Fountain YOB: 1960            Patient Care Team:  Alayna Maciel MD as PCP - General (Family Medicine)  Alayna Maciel MD as PCP - [facility] Empaneled Provider        Chief Complaint   Patient presents with    New Patient    Referral - General    Foot Pain     Referred by another pt for right heel pain possible plantar fascitis   She did have left plantar fascitis in the past  She did go to [facility] in July for xrays right foot no osseous pathology  Saw Dr. Cleo Shirley in Sept she wants a second opinion        Foot Pain     HPI:   Lester Fountain is a 63 y.o. female who presents to the office today complaining of right heel pain. This new patient is seen today regarding a right plantar fasciitis. Patient was seen back in July by the orthopedic center had x-rays which were negative patient was treated conservatively which had several icing stretching and night splint. Patient is still complaining of pain in the right heel mostly after sitting in the mornings. No trauma noted. .    Allergies   Allergen Reactions    Benadryl [Diphenhydramine] Anaphylaxis    Minocycline Nausea Only     dizziness    Vicodin [Hydrocodone-Acetaminophen] Nausea And Vomiting       Past Medical History:   Diagnosis Date    Ascending aorta enlargement (HCC)     no current issues- cardiology signed off 2019 (see Epic notes)     Chronic sinusitis     for OR 3-29-21     GERD (gastroesophageal reflux disease)     Headache     Hiatal hernia     Osteoporosis        Prior to Admission medications    Medication Sig Start Date End Date Taking?  Authorizing Provider   etodolac (LODINE) 400 MG tablet Take 1 tablet by mouth 2 times daily 10/25/22 4/23/23 Yes Aris Castillo DPM   azelastine (OPTIVAR) 0.05 % ophthalmic solution INSTILL 1 DROP IN BOTH EYES TWICE DAILY 7/11/22  Yes Historical Provider, MD   BIOTIN PO Take by mouth   Yes Historical Provider, MD   calcium citrate-vitamin D (CITRICAL + D) 315-250 MG-UNIT TABS per tablet Take 1 tablet by mouth daily (with breakfast)   Yes Historical Provider, MD   acetaminophen (TYLENOL) 325 MG tablet Take 650 mg by mouth every 6 hours as needed for Pain   Yes Historical Provider, MD   omeprazole (PRILOSEC) 40 MG delayed release capsule TAKE 1 CAPSULE DAILY  Patient taking differently: daily as needed 1/20/21  Yes Rebekah Florentino MD   calcium carbonate (OSCAL) 500 MG TABS tablet Take 600 mg by mouth daily   Yes Historical Provider, MD   Multiple Vitamins-Minerals (CENTRUM SILVER PO) Take by mouth   Yes Historical Provider, MD   Polyethylene Glycol 3350 (MIRALAX PO) Take by mouth as needed   Yes Historical Provider, MD   Cholecalciferol 2000 UNITS CAPS Take by mouth   Yes Historical Provider, MD   Inulin (FIBER CHOICE PO) Take by mouth every other day    Yes Historical Provider, MD   denosumab (PROLIA) 60 MG/ML SOSY SC injection Inject 1 mL into the skin once for 1 dose  Patient not taking: No sig reported 7/6/21 7/6/21  Miky Hong DO   Magnesium 100 MG CAPS Take 250 mg by mouth daily   Patient not taking: No sig reported    Historical Provider, MD       Past Surgical History:   Procedure Laterality Date    COLONOSCOPY  2013    Dr Aparna Marina  2007    Dr. Shannon Dorsey  10/1995    MYOMECTOMY  03/1996    SHOULDER SURGERY Right 07/2002    SHOULDER SURGERY Left 09/2002    SINUS ENDOSCOPY N/A 3/29/2021    FUNCTIONAL ENDOSCOPIC SINUS SURGERY SEPTOPLASTY AND SUBMUCOSAL RESECTION OF INFERIOR TURBINOPLASTY performed by Guicho Sarah DO at [address] N/A 6/11/2018    EGD BIOPSY performed by Violet Ledezma MD at [address] Family History   Problem Relation Age of Onset    Diabetes Mother     High Blood Pressure Mother     Other Mother         brain tumor    Heart Disease Father 48        CABG x 4 (twice)    Cancer Father 66        esophageal    Diabetes Father     High Cholesterol Father     Other Sister 54        MS    High Cholesterol Sister     Osteoporosis Sister     High Cholesterol Sister     Mult Sclerosis Sister     Osteoporosis Sister     Other Other         autoimmune disease       Social History     Tobacco Use    Smoking status: Former     Packs/day: 1.00     Years: 30.00     Pack years: 30.00     Types: Cigarettes     Quit date: 10/10/2007     Years since quitting: 15.0    Smokeless tobacco: Never   Substance Use Topics    Alcohol use: Yes     Comment: 2 or 3 beer or wine weekly       Review of Systems    Review of Systems:   History obtained from chart review and the patient  General ROS: negative for - chills, fatigue, fever, night sweats or weight gain  Constitutional: Negative for chills, diaphoresis, fatigue, fever and unexpected weight change. Musculoskeletal: Positive for . Neurological ROS: negative for - behavioral changes, confusion, headaches or seizures. Negative for weakness and numbness. Dermatological ROS: negative for - mole changes, rash  Cardiovascular: Negative for leg swelling. Gastrointestinal: Negative for constipation, diarrhea, nausea and vomiting. Lower Extremity Physical Examination:   Vitals:   Vitals:    10/25/22 1111   BP: 126/78   Temp: 98.2 °F (36.8 °C)        Foot Exam    General  General Appearance: appears stated age and healthy   Orientation: alert and oriented to person, place, and time       Right Foot/Ankle     Inspection and Palpation  Tenderness: calcaneus tenderness, bony tenderness and plantar fascia   Swelling: plantar fascia   Skin Exam: skin intact;      Neurovascular  Dorsalis pedis: 3+  Posterior tibial: 3+  Saphenous nerve sensation: normal  Tibial nerve sensation: normal  Superficial peroneal nerve sensation: normal  Deep peroneal nerve sensation: normal  Sural nerve sensation: normal  Achilles reflex: 2+  Babinski reflex: 2+    Muscle Strength  Ankle dorsiflexion: 5  Ankle plantar flexion: 5  Ankle inversion: 5  Ankle eversion: 5  Great toe extension: 5  Great toe flexion: 5        Right Ankle Exam     Muscle Strength   Dorsiflexion:  5/5  Plantar flexion:  5/5           General: AAO x 3 in NAD. Dermatologic Exam:  Skin lesion/ulceration Absent . Skin No rashes or nodules noted. .   Musculoskeletal:     1st MPJ ROM within normal limits, Bilateral.    Ankle ROM within normal limits,Bilateral.   HEEL PAIN WITH PALPATION INFERIOR ASPECT OF THE RIGHT FOOT PAIN WITH PALPATION AT THE INSERTION OF THE PLANTAR FASCIAL MUSCLE CALCANEUS. TARSAL TUNNEL PAIN denies polyuria or polydipsia  Vascular:     Radiographs:  3 views  foot/ankle:     Asessment: Patient is a 63 y.o. female with:   Saranya Rice was seen today for new patient, referral - general and foot pain. Diagnoses and all orders for this visit:    Plantar fascial fibromatosis    Pain in toe of right foot    Other orders  -     etodolac (LODINE) 400 MG tablet; Take 1 tablet by mouth 2 times daily  -     betamethasone acetate-betamethasone sodium phosphate (CELESTONE) injection 4 mg  -     bupivacaine (MARCAINE) 0.5 % injection 5 mg  -     betamethasone acetate-betamethasone sodium phosphate (CELESTONE) injection 4 mg  -     bupivacaine (MARCAINE) 0.5 % injection 5 mg       Plan: Patient examined and evaluated. Current condition and treatment options discussed in detail. Discussed conservative and surgical options with the patient. Treatment options today consisted of verbal and written instructions given to patient. Contact office with any questions/problems/concerns. RTC in 3week(s)  Potential risks, complications, alternative treatments, and procedure prognosis were explained to the patient.    Verbal informed consent was obtained from the patient. Right foot  Chlora prep preparation was performed over plantar fascia. 1 mL of 0.5% Marcaine plain and 1cc celestone Soluspan   injected in standard medial approach plantar fascia. Patient tolerated steroid injection well. Band-Aid applied. The patient was educated on a possible steroid flare and the use of ice with frozen water bottle 10 minutes each night discussed. Today I placed the patient on Lodine twice a day icing stretching night splint inserts injection. Continue passive and active range of motion stretches and strengthening bilateral plantar fascia and Achilles tendons.  .    10/25/2022    Electronically signed by Drake Darling DPM on 10/25/2022 at 12:50 PM  10/25/2022

## 2022-12-01 ENCOUNTER — OFFICE VISIT (OUTPATIENT)
Dept: PODIATRY | Age: 62
End: 2022-12-01
Payer: OTHER GOVERNMENT

## 2022-12-01 VITALS
DIASTOLIC BLOOD PRESSURE: 78 MMHG | TEMPERATURE: 98.2 F | WEIGHT: 134 LBS | BODY MASS INDEX: 24.12 KG/M2 | SYSTOLIC BLOOD PRESSURE: 126 MMHG

## 2022-12-01 DIAGNOSIS — M79.674 PAIN IN TOE OF RIGHT FOOT: ICD-10-CM

## 2022-12-01 DIAGNOSIS — M72.2 PLANTAR FASCIAL FIBROMATOSIS: Primary | ICD-10-CM

## 2022-12-01 PROCEDURE — 99213 OFFICE O/P EST LOW 20 MIN: CPT | Performed by: PODIATRIST

## 2022-12-01 RX ORDER — PREDNISONE 10 MG/1
10 TABLET ORAL DAILY
Qty: 18 TABLET | Refills: 1 | Status: SHIPPED | OUTPATIENT
Start: 2022-12-01 | End: 2022-12-10

## 2022-12-01 RX ORDER — BACLOFEN 10 MG/1
10 TABLET ORAL 2 TIMES DAILY
Qty: 42 TABLET | Refills: 0 | Status: SHIPPED | OUTPATIENT
Start: 2022-12-01 | End: 2022-12-22

## 2022-12-01 NOTE — PROGRESS NOTES
22  Zay Gama : 1960 Sex: female  Age: 63 y.o. Patient was referred by Shaniqua King MD    Chief Complaint   Patient presents with    Foot Pain     Plantar fascitis feels great right side   Taking nsaids which is hurting her stomach        SUBJECTIVE patient is seen today for follow-up of plantar fasciitis right foot. Patient is feeling about 90% better. Patient does state the Lodine was bothering her stomach.   Foot Pain     Review of Systems  Const: Denies constitutional symptoms  Musculo: Denies symptoms other than stated above  Skin: Denies symptoms other than stated above       Current Outpatient Medications:     predniSONE (DELTASONE) 10 MG tablet, Take 1 tablet by mouth daily for 9 days 3 tabs  Qd x 3  days   2 tabs qd x 3 days  1 tab qd  X 3 days, Disp: 18 tablet, Rfl: 1    baclofen (LIORESAL) 10 MG tablet, Take 1 tablet by mouth 2 times daily for 21 days, Disp: 42 tablet, Rfl: 0    azelastine (OPTIVAR) 0.05 % ophthalmic solution, INSTILL 1 DROP IN BOTH EYES TWICE DAILY, Disp: , Rfl:     BIOTIN PO, Take by mouth, Disp: , Rfl:     calcium citrate-vitamin D (CITRICAL + D) 315-250 MG-UNIT TABS per tablet, Take 1 tablet by mouth daily (with breakfast), Disp: , Rfl:     acetaminophen (TYLENOL) 325 MG tablet, Take 650 mg by mouth every 6 hours as needed for Pain, Disp: , Rfl:     denosumab (PROLIA) 60 MG/ML SOSY SC injection, Inject 1 mL into the skin once for 1 dose, Disp: 1 mL, Rfl: 1    omeprazole (PRILOSEC) 40 MG delayed release capsule, TAKE 1 CAPSULE DAILY (Patient taking differently: daily as needed), Disp: 90 capsule, Rfl: 3    calcium carbonate (OSCAL) 500 MG TABS tablet, Take 600 mg by mouth daily, Disp: , Rfl:     Multiple Vitamins-Minerals (CENTRUM SILVER PO), Take by mouth, Disp: , Rfl:     Polyethylene Glycol 3350 (MIRALAX PO), Take by mouth as needed, Disp: , Rfl:     Magnesium 100 MG CAPS, Take 250 mg by mouth daily, Disp: , Rfl:     Cholecalciferol 2000 UNITS CAPS, Take by mouth, Disp: , Rfl:     Inulin (FIBER CHOICE PO), Take by mouth every other day , Disp: , Rfl:   Allergies   Allergen Reactions    Benadryl [Diphenhydramine] Anaphylaxis    Minocycline Nausea Only     dizziness    Vicodin [Hydrocodone-Acetaminophen] Nausea And Vomiting       Past Medical History:   Diagnosis Date    Ascending aorta enlargement (HCC)     no current issues- cardiology signed off 2019 (see Epic notes)     Chronic sinusitis     for OR 3-29-21     GERD (gastroesophageal reflux disease)     Headache     Hiatal hernia     Osteoporosis      Past Surgical History:   Procedure Laterality Date    COLONOSCOPY  2013    Dr Dee Barajas  2007    Dr. Gail Raya  10/1995    MYOMECTOMY  03/1996    SHOULDER SURGERY Right 07/2002    SHOULDER SURGERY Left 09/2002    SINUS ENDOSCOPY N/A 3/29/2021    FUNCTIONAL ENDOSCOPIC SINUS SURGERY SEPTOPLASTY AND SUBMUCOSAL RESECTION OF INFERIOR TURBINOPLASTY performed by Preston Saravia DO at [facility] N/A 6/11/2018    EGD BIOPSY performed by Danny Moreno MD at [facility] ENDOSCOPY     Family History   Problem Relation Age of Onset    Diabetes Mother     High Blood Pressure Mother     Other Mother         brain tumor    Heart Disease Father 48        CABG x 4 (twice)    Cancer Father 66        esophageal    Diabetes Father     High Cholesterol Father     Other Sister 54        MS    High Cholesterol Sister     Osteoporosis Sister     High Cholesterol Sister     Mult Sclerosis Sister     Osteoporosis Sister     Other Other         autoimmune disease     Social History     Socioeconomic History    Marital status:      Spouse name: Not on file    Number of children: Not on file    Years of education: Not on file    Highest education level: Not on file   Occupational History    Occupation: unemployed- OFFICE   Tobacco Use    Smoking status: Former     Packs/day: 1.00     Years: 30.00     Pack years: 30.00     Types: Cigarettes     Quit date: 10/10/2007     Years since quitting: 15.1    Smokeless tobacco: Never   Vaping Use    Vaping Use: Never used   Substance and Sexual Activity    Alcohol use: Yes     Comment: 2 or 3 beer or wine weekly    Drug use: No    Sexual activity: Yes     Partners: Male   Other Topics Concern    Not on file   Social History Narrative    Not on file     Social Determinants of Health     Financial Resource Strain: Low Risk     Difficulty of Paying Living Expenses: Not hard at all   Food Insecurity: No Food Insecurity    Worried About Running Out of Food in the Last Year: Never true    Ran Out of Food in the Last Year: Never true   Transportation Needs: Not on file   Physical Activity: Not on file   Stress: Not on file   Social Connections: Not on file   Intimate Partner Violence: Not on file   Housing Stability: Not on file       Vitals:    12/01/22 1520   BP: 126/78   Temp: 98.2 °F (36.8 °C)   TempSrc: Temporal   Weight: 134 lb (60.8 kg)       Focused Lower Extremity Physical Exam:  Vitals:    12/01/22 1520   BP: 126/78   Temp: 98.2 °F (36.8 °C)        Foot Exam    General  General Appearance: appears stated age and healthy   Orientation: alert and oriented to person, place, and time       Right Foot/Ankle     Inspection and Palpation  Tenderness: calcaneus tenderness, bony tenderness and plantar fascia   Skin Exam: skin intact;      Neurovascular  Dorsalis pedis: 3+  Posterior tibial: 3+  Saphenous nerve sensation: normal  Tibial nerve sensation: normal  Superficial peroneal nerve sensation: normal  Deep peroneal nerve sensation: normal  Sural nerve sensation: normal  Achilles reflex: 2+  Babinski reflex: 2+           Ortho Exam    Vascular: pulses  dp  pt    Capillary Refill Time:   Hair growth  Skin:    Edema:    Neurologic:      Musculoskeletal/ Orthopedic examination: Normal pain with palpation to the inferior aspect of the right heel minimal pain with palpation with dorsiflexion plantarflexion inversion eversion  NAIL   Web space   Derm:          Assessment and Plan: I did place the patient on prednisone and a muscle relaxer for 3 or 4 days. Patient is to reappoint in a month[facility]. Diagnoses and all orders for this visit:    Plantar fascial fibromatosis    Pain in toe of right foot    Other orders  -     predniSONE (DELTASONE) 10 MG tablet; Take 1 tablet by mouth daily for 9 days 3 tabs  Qd x 3  days   2 tabs qd x 3 days  1 tab qd  X 3 days  -     baclofen (LIORESAL) 10 MG tablet; Take 1 tablet by mouth 2 times daily for 21 days      No follow-ups on file. Seen By:  Elbert Cleverly, DPM      Document was created using voice recognition software. Note was reviewed, however may contain grammatical errors.

## 2023-01-05 DIAGNOSIS — M81.0 SENILE OSTEOPOROSIS: ICD-10-CM

## 2023-01-05 LAB — CALCIUM SERPL-MCNC: 9.4 MG/DL (ref 8.6–10.2)

## 2023-01-24 ENCOUNTER — HOSPITAL ENCOUNTER (OUTPATIENT)
Dept: INFUSION THERAPY | Age: 63
Setting detail: INFUSION SERIES
Discharge: HOME | End: 2023-01-24
Payer: OTHER GOVERNMENT

## 2023-01-24 VITALS
OXYGEN SATURATION: 100 % | HEIGHT: 63 IN | RESPIRATION RATE: 18 BRPM | SYSTOLIC BLOOD PRESSURE: 143 MMHG | HEART RATE: 76 BPM | WEIGHT: 138 LBS | BODY MASS INDEX: 24.45 KG/M2 | DIASTOLIC BLOOD PRESSURE: 91 MMHG | TEMPERATURE: 96.6 F

## 2023-01-24 DIAGNOSIS — M85.80 OSTEOPENIA, UNSPECIFIED LOCATION: Primary | ICD-10-CM

## 2023-01-24 PROCEDURE — 96372 THER/PROPH/DIAG INJ SC/IM: CPT

## 2023-01-24 PROCEDURE — 6360000002 HC RX W HCPCS: Performed by: OBSTETRICS & GYNECOLOGY

## 2023-01-24 RX ADMIN — DENOSUMAB 60 MG: 60 INJECTION SUBCUTANEOUS at 13:13

## 2023-01-24 ASSESSMENT — PAIN DESCRIPTION - FREQUENCY: FREQUENCY: CONTINUOUS

## 2023-01-24 ASSESSMENT — PAIN DESCRIPTION - LOCATION: LOCATION: FOOT

## 2023-01-24 ASSESSMENT — PAIN SCALES - GENERAL: PAINLEVEL_OUTOF10: 4

## 2023-01-24 ASSESSMENT — PAIN DESCRIPTION - PAIN TYPE: TYPE: CHRONIC PAIN

## 2023-01-24 ASSESSMENT — PAIN DESCRIPTION - ORIENTATION: ORIENTATION: RIGHT

## 2023-01-24 ASSESSMENT — PAIN DESCRIPTION - DESCRIPTORS: DESCRIPTORS: DULL;SHARP

## 2023-01-31 ENCOUNTER — OFFICE VISIT (OUTPATIENT)
Dept: PODIATRY | Age: 63
End: 2023-01-31
Payer: OTHER GOVERNMENT

## 2023-01-31 VITALS
BODY MASS INDEX: 24.84 KG/M2 | TEMPERATURE: 98.2 F | WEIGHT: 138 LBS | DIASTOLIC BLOOD PRESSURE: 79 MMHG | SYSTOLIC BLOOD PRESSURE: 133 MMHG

## 2023-01-31 DIAGNOSIS — M79.674 PAIN IN TOE OF RIGHT FOOT: Primary | ICD-10-CM

## 2023-01-31 DIAGNOSIS — M79.675 PAIN IN LEFT TOE(S): ICD-10-CM

## 2023-01-31 DIAGNOSIS — M72.2 PLANTAR FASCIAL FIBROMATOSIS: ICD-10-CM

## 2023-01-31 PROCEDURE — 99213 OFFICE O/P EST LOW 20 MIN: CPT | Performed by: PODIATRIST

## 2023-01-31 NOTE — PROGRESS NOTES
22  Clari Haynes : 1960 Sex: female  Age: 63 y.o. Patient was referred by Giorgi Calle MD    Chief Complaint   Patient presents with    Foot Pain     Right Plantar fascitis now her left foot heel is bothering her   Pt had an injection right heel before it did help   Pt did not want xrays today   Pt has been icing and stretching not consistent   Does not want an inj today wants to hold off        SUBJECTIVE patient is seen today for follow-up of plantar fasciitis right foot. Patient is feeling about 90% better. Patient does state the Lodine was bothering her stomach. Foot Pain   This is a recurrent problem. The current episode started more than 1 year ago. There has been no history of extremity trauma.    Review of Systems  Const: Denies constitutional symptoms  Musculo: Denies symptoms other than stated above  Skin: Denies symptoms other than stated above       Current Outpatient Medications:     azelastine (OPTIVAR) 0.05 % ophthalmic solution, INSTILL 1 DROP IN BOTH EYES TWICE DAILY, Disp: , Rfl:     BIOTIN PO, Take by mouth, Disp: , Rfl:     calcium citrate-vitamin D (CITRICAL + D) 315-250 MG-UNIT TABS per tablet, Take 1 tablet by mouth daily (with breakfast), Disp: , Rfl:     acetaminophen (TYLENOL) 325 MG tablet, Take 650 mg by mouth every 6 hours as needed for Pain, Disp: , Rfl:     omeprazole (PRILOSEC) 40 MG delayed release capsule, TAKE 1 CAPSULE DAILY (Patient taking differently: daily as needed), Disp: 90 capsule, Rfl: 3    calcium carbonate (OSCAL) 500 MG TABS tablet, Take 600 mg by mouth daily, Disp: , Rfl:     Multiple Vitamins-Minerals (CENTRUM SILVER PO), Take by mouth, Disp: , Rfl:     Polyethylene Glycol 3350 (MIRALAX PO), Take by mouth as needed, Disp: , Rfl:     Magnesium 100 MG CAPS, Take 250 mg by mouth daily, Disp: , Rfl:     Cholecalciferol 2000 UNITS CAPS, Take by mouth, Disp: , Rfl:     Inulin (FIBER CHOICE PO), Take by mouth every other day , Disp: , Rfl: denosumab (PROLIA) 60 MG/ML SOSY SC injection, Inject 1 mL into the skin once for 1 dose, Disp: 1 mL, Rfl: 1    denosumab (PROLIA) 60 MG/ML SOSY SC injection, Inject 1 mL into the skin once for 1 dose, Disp: 1 mL, Rfl: 1  Allergies   Allergen Reactions    Benadryl [Diphenhydramine] Anaphylaxis    Minocycline Nausea Only     dizziness    Vicodin [Hydrocodone-Acetaminophen] Nausea And Vomiting       Past Medical History:   Diagnosis Date    Ascending aorta enlargement (HCC)     no current issues- cardiology signed off 2019 (see Epic notes)     Chronic sinusitis     for OR 3-29-21     GERD (gastroesophageal reflux disease)     Headache     Hiatal hernia     Osteoporosis      Past Surgical History:   Procedure Laterality Date    COLONOSCOPY  2013    Dr Renita Daniels  2007    Dr. Sebastian Ernandez  10/1995    MYOMECTOMY  03/1996    SHOULDER SURGERY Right 07/2002    SHOULDER SURGERY Left 09/2002    SINUS ENDOSCOPY N/A 3/29/2021    FUNCTIONAL ENDOSCOPIC SINUS SURGERY SEPTOPLASTY AND SUBMUCOSAL RESECTION OF INFERIOR TURBINOPLASTY performed by Leeroy Godoy DO at [facility]  N/A 6/11/2018    EGD BIOPSY performed by Paul Casillas MD at [facility] ENDOSCOPY     Family History   Problem Relation Age of Onset    Diabetes Mother     High Blood Pressure Mother     Other Mother         brain tumor    Heart Disease Father 48        CABG x 4 (twice)    Cancer Father 66        esophageal    Diabetes Father     High Cholesterol Father     Other Sister 54        MS    High Cholesterol Sister     Osteoporosis Sister     High Cholesterol Sister     Mult Sclerosis Sister     Osteoporosis Sister     Other Other         autoimmune disease     Social History     Socioeconomic History    Marital status:      Spouse name: Not on file    Number of children: Not on file    Years of education: Not on file    Highest education level: Not on file   Occupational History Occupation: unemployed- OFFICE   Tobacco Use    Smoking status: Former     Packs/day: 1.00     Years: 30.00     Pack years: 30.00     Types: Cigarettes     Quit date: 10/10/2007     Years since quitting: 15.3    Smokeless tobacco: Never   Vaping Use    Vaping Use: Never used   Substance and Sexual Activity    Alcohol use: Yes     Comment: 2 or 3 beer or wine weekly    Drug use: No    Sexual activity: Yes     Partners: Male   Other Topics Concern    Not on file   Social History Narrative    Not on file     Social Determinants of Health     Financial Resource Strain: Low Risk     Difficulty of Paying Living Expenses: Not hard at all   Food Insecurity: No Food Insecurity    Worried About Running Out of Food in the Last Year: Never true    Ran Out of Food in the Last Year: Never true   Transportation Needs: Not on file   Physical Activity: Not on file   Stress: Not on file   Social Connections: Not on file   Intimate Partner Violence: Not on file   Housing Stability: Not on file       Vitals:    01/31/23 1307   BP: 133/79   Temp: 98.2 °F (36.8 °C)   TempSrc: Temporal   Weight: 138 lb (62.6 kg)       Focused Lower Extremity Physical Exam:  Vitals:    01/31/23 1307   BP: 133/79   Temp: 98.2 °F (36.8 °C)        Foot Exam    General  General Appearance: appears stated age and healthy   Orientation: alert and oriented to person, place, and time       Right Foot/Ankle     Inspection and Palpation  Tenderness: calcaneus tenderness, bony tenderness and plantar fascia   Skin Exam: skin intact;      Neurovascular  Dorsalis pedis: 3+  Posterior tibial: 3+  Saphenous nerve sensation: normal  Tibial nerve sensation: normal  Superficial peroneal nerve sensation: normal  Deep peroneal nerve sensation: normal  Sural nerve sensation: normal  Achilles reflex: 2+  Babinski reflex: 2+           Ortho Exam    Vascular: pulses  dp  pt    Capillary Refill Time:   Hair growth  Skin:    Edema:    Neurologic:      Musculoskeletal/ Orthopedic examination: Normal pain with palpation to the inferior aspect of the right heel minimal pain with palpation with dorsiflexion plantarflexion inversion eversion  NAIL   Web space   Derm:          Assessment and Plan: I.  Patient is to reappoint in a month. [facility] was [facility] today for foot pain. Diagnoses and all orders for this visit:    Pain in toe of right foot  -     Sedimentation Rate; Future  -     RHEUMATOID FACTOR; Future  -     SANDY; Future  -     ANTI-COLETTE 1 ANTIBODY, IGG; Future    Plantar fascial fibromatosis    Pain in left toe(s)      No follow-ups on file. Seen By:  Sybil Brar DPM      Document was created using voice recognition software. Note was reviewed, however may contain grammatical errors.

## 2023-02-14 SDOH — ECONOMIC STABILITY: TRANSPORTATION INSECURITY
IN THE PAST 12 MONTHS, HAS LACK OF TRANSPORTATION KEPT YOU FROM MEETINGS, WORK, OR FROM GETTING THINGS NEEDED FOR DAILY LIVING?: PATIENT DECLINED

## 2023-02-14 SDOH — ECONOMIC STABILITY: INCOME INSECURITY: HOW HARD IS IT FOR YOU TO PAY FOR THE VERY BASICS LIKE FOOD, HOUSING, MEDICAL CARE, AND HEATING?: PATIENT DECLINED

## 2023-02-14 SDOH — ECONOMIC STABILITY: FOOD INSECURITY: WITHIN THE PAST 12 MONTHS, YOU WORRIED THAT YOUR FOOD WOULD RUN OUT BEFORE YOU GOT MONEY TO BUY MORE.: PATIENT DECLINED

## 2023-02-14 SDOH — ECONOMIC STABILITY: HOUSING INSECURITY
IN THE LAST 12 MONTHS, WAS THERE A TIME WHEN YOU DID NOT HAVE A STEADY PLACE TO SLEEP OR SLEPT IN A SHELTER (INCLUDING NOW)?: PATIENT REFUSED

## 2023-02-14 SDOH — ECONOMIC STABILITY: FOOD INSECURITY: WITHIN THE PAST 12 MONTHS, THE FOOD YOU BOUGHT JUST DIDN'T LAST AND YOU DIDN'T HAVE MONEY TO GET MORE.: PATIENT DECLINED

## 2023-02-15 ENCOUNTER — OFFICE VISIT (OUTPATIENT)
Dept: FAMILY MEDICINE CLINIC | Age: 63
End: 2023-02-15

## 2023-02-15 VITALS
OXYGEN SATURATION: 97 % | TEMPERATURE: 98 F | DIASTOLIC BLOOD PRESSURE: 86 MMHG | BODY MASS INDEX: 24.45 KG/M2 | SYSTOLIC BLOOD PRESSURE: 122 MMHG | WEIGHT: 138 LBS | HEART RATE: 92 BPM | RESPIRATION RATE: 16 BRPM | HEIGHT: 63 IN

## 2023-02-15 DIAGNOSIS — Z12.11 SCREEN FOR COLON CANCER: Primary | ICD-10-CM

## 2023-02-15 DIAGNOSIS — R73.01 IMPAIRED FASTING GLUCOSE: ICD-10-CM

## 2023-02-15 DIAGNOSIS — E78.5 HYPERLIPIDEMIA, UNSPECIFIED HYPERLIPIDEMIA TYPE: ICD-10-CM

## 2023-02-15 LAB — HBA1C MFR BLD: 5.5 %

## 2023-02-15 RX ORDER — OMEPRAZOLE 40 MG/1
40 CAPSULE, DELAYED RELEASE ORAL DAILY PRN
Qty: 90 CAPSULE | Refills: 3 | Status: SHIPPED | OUTPATIENT
Start: 2023-02-15

## 2023-02-15 ASSESSMENT — PATIENT HEALTH QUESTIONNAIRE - PHQ9
SUM OF ALL RESPONSES TO PHQ QUESTIONS 1-9: 0
SUM OF ALL RESPONSES TO PHQ QUESTIONS 1-9: 0
1. LITTLE INTEREST OR PLEASURE IN DOING THINGS: 0
SUM OF ALL RESPONSES TO PHQ QUESTIONS 1-9: 0
SUM OF ALL RESPONSES TO PHQ QUESTIONS 1-9: 0
SUM OF ALL RESPONSES TO PHQ9 QUESTIONS 1 & 2: 0
2. FEELING DOWN, DEPRESSED OR HOPELESS: 0

## 2023-02-15 NOTE — PROGRESS NOTES
Hyperlipidemia:  Patient is here to follow up regarding chronic hyperlipidemia. This is  generally controlled. Treatment includes diet. Patient is  compliant with lifestyle modifications. Patient is not a smoker. Most recent labs reviewed with patient today and are not remarkable. Comorbid conditions include none. Having some problems with her bowels, sinuses and pain. States that she wants to discuss lab work and she is having trouble sleeping. She is taking fiber, Miralax and probiotic and still cannot go. Lab Results   Component Value Date    LDLCALC 132 (H) 02/09/2023       Patient's past medical, surgical, social and/or family history reviewed, updated in chart, and are non-contributory (unless otherwise stated). Medications and allergies also reviewed and updated in chart.       Review of Systems:  Constitutional:  No fever, no fatigue, no chills, no headaches, no weight change  Dermatology:  No rash, no mole, no dry or sensitive skin  ENT:  No cough, no sore throat, no sinus pain, no runny nose, no ear pain  Cardiology:  No chest pain, no palpitations, no leg edema, no shortness of breath, no PND  Gastroenterology:  No dysphagia, no abdominal pain, no nausea, no vomiting, no constipation, no diarrhea, no heartburn  Musculoskeletal:  No joint pain, no leg cramps, no back pain, no muscle aches  Respiratory:  No shortness of breath, no orthopnea, no wheezing, no CAMARGO, no hemoptysis  Urology:  No blood in the urine, no urinary frequency, no urinary incontinence, no urinary urgency, no nocturia, no dysuria  Vitals:    02/15/23 1321   BP: 122/86   Pulse: 92   Resp: 16   Temp: 98 °F (36.7 °C)   SpO2: 97%   Weight: 138 lb (62.6 kg)   Height: 5' 2.5\" (1.588 m)       General:  Patient alert and oriented x 3, NAD, pleasant  HEENT:  Atraumatic, normocephalic, PERRLA, EOMI, clear conjunctiva, TMs clear, nose-clear, throat - no erythema  Neck:  Supple, no goiter, no carotid bruits, no lymphadenopathy  Lungs:  CTA B  Heart:  RRR, no murmurs, gallops or rubs  Abdomen:  Soft/nt/nd, + bowel sounds  Extremities:  No clubbing, cyanosis or edema  Skin: unremarkable    Assessment/Plan:      Perez Zafar was seen today for discuss labs, constipation, insomnia, other and pain. Diagnoses and all orders for this visit:    Screen for colon cancer  -     Ruy Menchaca MD, General Surgery, [place]    Hyperlipidemia, unspecified hyperlipidemia type  -     Comprehensive Metabolic Panel; Future  -     CBC; Future  -     Lipid Panel; Future    Impaired fasting glucose  -     Hemoglobin A1C; Future  -     POCT glycosylated hemoglobin (Hb A1C)    Other orders  -     omeprazole (PRILOSEC) 40 MG delayed release capsule; Take 1 capsule by mouth daily as needed (acid reflux)    As above. Call or go to ED immediately if symptoms worsen or persist.  No follow-ups on file. , or sooner if necessary. Educational materials and/or home exercises printed for patient's review and were included in patient instructions on his/her After Visit Summary and given to patient at the end of visit. Counseled regarding above diagnosis, including possible risks and complications,  especially if left uncontrolled. Counseled regarding the possible side effects, risks, benefits and alternatives to treatment; patient and/or guardian verbalizes understanding, agrees, feels comfortable with and wishes to proceed with above treatment plan. Advised patient to call with any new medication issues, and read all Rx info from pharmacy to assure aware of all possible risks and side effects of medication before taking. Reviewed age and gender appropriate health screening exams and vaccinations.   Advised patient regarding importance of keeping up with recommended health maintenance and to schedule as soon as possible if overdue, as this is important in assessing for undiagnosed pathology, especially cancer, as well as protecting against potentially harmful/life threatening disease. Patient and/or guardian verbalizes understanding and agrees with above counseling, assessment and plan. All questions answered. Alley Hargrove MD  2/15/2023    I have personally reviewed and updated the chief complaint, HPI, Past Medical, Family and Social History, as well as the above Review of Systems.

## 2023-02-28 ENCOUNTER — OFFICE VISIT (OUTPATIENT)
Dept: PODIATRY | Age: 63
End: 2023-02-28
Payer: OTHER GOVERNMENT

## 2023-02-28 VITALS
TEMPERATURE: 97.7 F | BODY MASS INDEX: 24.84 KG/M2 | DIASTOLIC BLOOD PRESSURE: 72 MMHG | SYSTOLIC BLOOD PRESSURE: 126 MMHG | WEIGHT: 138 LBS

## 2023-02-28 DIAGNOSIS — M72.2 PLANTAR FASCIAL FIBROMATOSIS: ICD-10-CM

## 2023-02-28 DIAGNOSIS — M79.674 PAIN IN TOE OF RIGHT FOOT: ICD-10-CM

## 2023-02-28 DIAGNOSIS — M79.675 PAIN IN LEFT TOE(S): Primary | ICD-10-CM

## 2023-02-28 PROCEDURE — 99213 OFFICE O/P EST LOW 20 MIN: CPT | Performed by: PODIATRIST

## 2023-02-28 NOTE — PROGRESS NOTES
22  Caity Muhammad : 1960 Sex: female  Age: 66 y.o. Patient was referred by Noel Oliva MD    Chief Complaint   Patient presents with    Foot Pain     F/u Plantar fascitis and BW results WNL  Pt states she is doing really well today no issues noted        SUBJECTIVE patient is seen today for follow-up of plantar fasciitis right foot. Patient is feeling about 90% better. Patient does state the Lodine was bothering her stomach. Foot Pain   This is a recurrent problem. The current episode started more than 1 year ago. There has been no history of extremity trauma. The problem has been rapidly improving.    Review of Systems  Const: Denies constitutional symptoms  Musculo: Denies symptoms other than stated above  Skin: Denies symptoms other than stated above       Current Outpatient Medications:     omeprazole (PRILOSEC) 40 MG delayed release capsule, Take 1 capsule by mouth daily as needed (acid reflux), Disp: 90 capsule, Rfl: 3    Probiotic Product (PROBIOTIC DAILY PO), Take by mouth, Disp: , Rfl:     azelastine (OPTIVAR) 0.05 % ophthalmic solution, INSTILL 1 DROP IN BOTH EYES TWICE DAILY, Disp: , Rfl:     BIOTIN PO, Take by mouth, Disp: , Rfl:     calcium citrate-vitamin D (CITRICAL + D) 315-250 MG-UNIT TABS per tablet, Take 1 tablet by mouth daily (with breakfast), Disp: , Rfl:     acetaminophen (TYLENOL) 325 MG tablet, Take 650 mg by mouth every 6 hours as needed for Pain, Disp: , Rfl:     calcium carbonate (OSCAL) 500 MG TABS tablet, Take 600 mg by mouth daily, Disp: , Rfl:     Multiple Vitamins-Minerals (CENTRUM SILVER PO), Take by mouth, Disp: , Rfl:     Polyethylene Glycol 3350 (MIRALAX PO), Take by mouth as needed, Disp: , Rfl:     Magnesium 100 MG CAPS, Take 250 mg by mouth daily, Disp: , Rfl:     Cholecalciferol 2000 UNITS CAPS, Take by mouth, Disp: , Rfl:     Inulin (FIBER CHOICE PO), Take by mouth every other day , Disp: , Rfl:     denosumab (PROLIA) 60 MG/ML SOSY SC injection, Inject 1 mL into the skin once for 1 dose, Disp: 1 mL, Rfl: 1    denosumab (PROLIA) 60 MG/ML SOSY SC injection, Inject 1 mL into the skin once for 1 dose, Disp: 1 mL, Rfl: 1  Allergies   Allergen Reactions    Benadryl [Diphenhydramine] Anaphylaxis    Minocycline Nausea Only     dizziness    Vicodin [Hydrocodone-Acetaminophen] Nausea And Vomiting     ComponentRef Range & Units2/9/23 11799/27/14 1200 ANANEGATIVENEGATIVE NEGATIVE CM Comment: NEGATIVE:   <=16 years <1:10    >16 years <1:40   Test done by Indirect fluorescent Rheumatoid Factor0 - 13 IU/mL<10   Past Medical History:   Diagnosis Date    Ascending aorta enlargement ([address]     no current issues- cardiology signed off 2019 (see Epic notes)     Chronic sinusitis     for OR 3-29-21     GERD (gastroesophageal reflux disease)     Headache     Hiatal hernia     Osteoporosis      Past Surgical History:   Procedure Laterality Date    COLONOSCOPY  2013    Dr Aleksey Abbott  2007    Dr. Pam Peterson  10/1995    MYOMECTOMY  03/1996    SHOULDER SURGERY Right 07/2002    SHOULDER SURGERY Left 09/2002    SINUS ENDOSCOPY N/A 3/29/2021    FUNCTIONAL ENDOSCOPIC SINUS SURGERY SEPTOPLASTY AND SUBMUCOSAL RESECTION OF INFERIOR TURBINOPLASTY performed by Olman Quintana DO at [address] N/A 6/11/2018    EGD BIOPSY performed by Kwesi Montano MD at [facility] ENDOSCOPY     Family History   Problem Relation Age of Onset    Diabetes Mother     High Blood Pressure Mother     Other Mother         brain tumor    Heart Disease Father 48        CABG x 4 (twice)    Cancer Father 66        esophageal    Diabetes Father     High Cholesterol Father     Other Sister 54        MS    High Cholesterol Sister     Osteoporosis Sister     High Cholesterol Sister     Mult Sclerosis Sister     Osteoporosis Sister     Other Sister         MS    Other Other         autoimmune disease     Social History     Socioeconomic History    Marital status:      Spouse name: Not on file    Number of children: Not on file    Years of education: Not on file    Highest education level: Not on file   Occupational History    Occupation: unemployed- OFFICE   Tobacco Use    Smoking status: Former     Packs/day: 1.00     Years: 30.00     Pack years: 30.00     Types: Cigarettes     Quit date: 10/10/2007     Years since quitting: 15.3    Smokeless tobacco: Never   Vaping Use    Vaping Use: Never used   Substance and Sexual Activity    Alcohol use: Yes     Comment: 2 or 3 beer or wine weekly    Drug use: No    Sexual activity: Yes     Partners: Male   Other Topics Concern    Not on file   Social History Narrative    Not on file     Social Determinants of Health     Financial Resource Strain: Unknown    Difficulty of Paying Living Expenses: Patient refused   Food Insecurity: Unknown    Worried About [address] in the Last Year: Patient refused    [address] in the Last Year: Patient refused   Transportation Needs: Unknown    Lack of Transportation (Medical):  Not on file    Lack of Transportation (Non-Medical): Patient refused   Physical Activity: Not on file   Stress: Not on file   Social Connections: Not on file   Intimate Partner Violence: Not on file   Housing Stability: Unknown    Unable to Pay for Housing in the Last Year: Not on file    Number of [place] in the Last Year: Not on file    Unstable Housing in the Last Year: Patient refused       Vitals:    02/28/23 1302   BP: 126/72   Temp: 97.7 °F (36.5 °C)   TempSrc: Temporal   Weight: 138 lb (62.6 kg)       Focused Lower Extremity Physical Exam:  Vitals:    02/28/23 1302   BP: 126/72   Temp: 97.7 °F (36.5 °C)        Foot Exam    General  General Appearance: appears stated age and healthy   Orientation: alert and oriented to person, place, and time       Right Foot/Ankle     Inspection and Palpation  Tenderness: calcaneus tenderness, bony tenderness and plantar fascia Skin Exam: skin intact; Neurovascular  Dorsalis pedis: 3+  Posterior tibial: 3+  Saphenous nerve sensation: normal  Tibial nerve sensation: normal  Superficial peroneal nerve sensation: normal  Deep peroneal nerve sensation: normal  Sural nerve sensation: normal  Achilles reflex: 2+  Babinski reflex: 2+           Ortho Exam    Vascular: pulses  dp  pt    Capillary Refill Time:   Hair growth  Skin:    Edema:    Neurologic:      Musculoskeletal/ Orthopedic examination: Normal pain with palpation to the inferior aspect of the right heel minimal pain with palpation with dorsiflexion plantarflexion inversion eversion  NAIL   Web space   Derm:          Assessment and Plan: I.  Patient is feeling much improvement with the plantar fasciitis I did review the blood work with the patient at this time the patient will continue to do the icing and stretching as needed she will reappoint as needed. Layo Hidalgo was seen today for foot pain. Diagnoses and all orders for this visit:    Pain in left toe(s)    Plantar fascial fibromatosis    Pain in toe of right foot      Return if symptoms worsen or fail to improve. Seen By:  Suresh Sheridan DPM      Document was created using voice recognition software. Note was reviewed, however may contain grammatical errors.

## 2023-05-22 ENCOUNTER — OFFICE VISIT (OUTPATIENT)
Dept: SURGERY | Age: 63
End: 2023-05-22
Payer: OTHER GOVERNMENT

## 2023-05-22 VITALS
HEART RATE: 89 BPM | SYSTOLIC BLOOD PRESSURE: 152 MMHG | WEIGHT: 154 LBS | DIASTOLIC BLOOD PRESSURE: 94 MMHG | RESPIRATION RATE: 16 BRPM | HEIGHT: 63 IN | BODY MASS INDEX: 27.29 KG/M2

## 2023-05-22 DIAGNOSIS — K59.00 CONSTIPATION, UNSPECIFIED CONSTIPATION TYPE: Primary | ICD-10-CM

## 2023-05-22 PROCEDURE — 99243 OFF/OP CNSLTJ NEW/EST LOW 30: CPT | Performed by: SURGERY

## 2023-05-22 RX ORDER — SODIUM CHLORIDE 9 MG/ML
INJECTION, SOLUTION INTRAVENOUS CONTINUOUS
OUTPATIENT
Start: 2023-05-22

## 2023-05-22 NOTE — PROGRESS NOTES
General Surgery History and Physical    Patient's Name/Date of Birth: Lulu Page / 1960    Date: 5/22/2023    PCP: Annette Paredes MD    Referring Physician:   Cas Crocker MD  [phone]    CHIEF COMPLAINT:    Chief Complaint   Patient presents with    New Patient    Colonoscopy     consult         HISTORY OF PRESENT ILLNESS:    Lulu Page is an 66 y.o. female who presents for a colonoscopy. The patient has chronic constipation. She said she has been taking fiber for years. She said she has become tolerant to different medications over the years. She has taken laxatives over the years. She has been taking 800 mg Magnesium and that gave her diarrhea. She is now taking magnesium 400 mg daily and she is also taking probiotics. No nausea, vomiting, diarrhea. No changes in stool caliber. No bloody or black stools. No abdominal pain. No unintentional weight loss. No family history of colon cancer. The patient has a known history of: no known risk factors. The patient has had a colonoscopy before - 10 years ago and was normal per the patient.     Past Medical History:   Past Medical History:   Diagnosis Date    Ascending aorta enlargement ([address]     no current issues- cardiology signed off 2019 (see Epic notes)     Chronic sinusitis     for OR 3-29-21     GERD (gastroesophageal reflux disease)     Headache     Hiatal hernia     Osteoporosis         Past Surgical History:   Past Surgical History:   Procedure Laterality Date    COLONOSCOPY  2013    Dr Ariella Garcia  2007    Dr. Charis Kamara  10/1995    MYOMECTOMY  03/1996    SHOULDER SURGERY Right 07/2002    SHOULDER SURGERY Left 09/2002    SINUS ENDOSCOPY N/A 3/29/2021    FUNCTIONAL ENDOSCOPIC SINUS SURGERY SEPTOPLASTY AND SUBMUCOSAL RESECTION OF INFERIOR TURBINOPLASTY performed by Berta Hammer DO at [address] N/A 6/11/2018    EGD BIOPSY performed by Arti Brown

## 2023-05-22 NOTE — PATIENT INSTRUCTIONS
Add fiber supplement 2-4 times a day  Magnesium 400 mg daily  Continue probiotics  Miralax one cap daily as needed      General Surgery - Dr. Racheal Bauman MD, FACS    Preoperative Instructions    Please read the following information very carefully. It contains information that is necessary to best prepare you for your upcoming procedure. Make arrangements for a  to take you to and from your procedure. YOU MUST HAVE SOMEONE DRIVE YOU HOME - this cannot be a taxi or public transportation. You will not be administered anesthesia without someone to go home and be at home with you that day. Nothing to eat or drink after midnight the night before your procedure. Follow your bowel prep instructions if you have them for this procedure. 3 days prior to your procedure: Stop taking blood thinners like Coumadin or Plavix or Xarelto. 5 days prior to your procedure: Stop taking Aspirin or Aspirin containing products. If you cannot stop any of these medications prior to your procedure, please contact our office. Medications morning of procedure: Only heart, breathing, blood pressure, and seizure medications are permitted on the morning of your procedure. These medications can be taken with a sip of water. IF YOU ARE UNABLE TO KEEP THE ABOVE SCHEDULED PROCEDURE, YOU MUST NOTIFY DR. CARNEY'S OFFICE [phone]. NOT THE FACILITY. NO CHEWING GUM OR CHEWING TOBACCO AFTER MIDNIGHT ON DAY OF PROCEDURE.    YOU MUST HAVE TRANSPORTATION TO AND FROM THE FACILITY. What is a colonoscopy? A colonoscopy is a test that lets a doctor look inside your colon. The doctor uses a thin, lighted tube called a colonoscope to look for problems. These include small growths called polyps, cancer, or bleeding. During the test, the doctor can take samples of tissue that can be checked for cancer or other problems. This is called a biopsy. The doctor can also take out polyps.   Before the test, you will need

## 2023-05-23 ENCOUNTER — TELEPHONE (OUTPATIENT)
Dept: SURGERY | Age: 63
End: 2023-05-23

## 2023-05-23 PROBLEM — Z12.11 SCREENING FOR COLON CANCER: Status: ACTIVE | Noted: 2023-05-23

## 2023-05-23 NOTE — TELEPHONE ENCOUNTER
Prior Authorization Form:      DEMOGRAPHICS:                     Patient Name:  Ramiro Garza  Patient :  1960            Insurance:  Payor:  EAST / Plan:  EAST / Product Type: *No Product type* /   Insurance ID Number:    Payer/Plan Subscr  Sex Relation Sub.  Ins. ID Effective Group Num   1. [facility] Agus Neighbours 3/4/1959 Male Unknown [id number]                                    [address]         DIAGNOSIS & PROCEDURE:                       Procedure/Operation: COLONOSCOPY           CPT Code: 67439    Diagnosis:  SCREENING    ICD10 Code: Z12.11    Location:  [facility]    Surgeon:  Eric Lu    SCHEDULING INFORMATION:                          Date: 2023    Time: 11:30AM              Anesthesia:  MAC/TIVA                                                       Status:  Outpatient        Special Comments:         Electronically signed by Queta Sanches MA on 2023 at 11:34 AM

## 2023-06-01 DIAGNOSIS — Z12.31 SCREENING MAMMOGRAM, ENCOUNTER FOR: ICD-10-CM

## 2023-06-22 PROBLEM — Z12.11 SCREENING FOR COLON CANCER: Status: RESOLVED | Noted: 2023-05-23 | Resolved: 2023-06-22

## 2023-07-03 RX ORDER — LORATADINE 10 MG/1
10 TABLET ORAL DAILY
COMMUNITY

## 2023-07-07 ENCOUNTER — ANESTHESIA EVENT (OUTPATIENT)
Dept: ENDOSCOPY | Age: 63
End: 2023-07-07
Payer: OTHER GOVERNMENT

## 2023-07-07 ENCOUNTER — HOSPITAL ENCOUNTER (OUTPATIENT)
Age: 63
Setting detail: OUTPATIENT SURGERY
Discharge: HOME | End: 2023-07-07
Attending: SURGERY | Admitting: SURGERY
Payer: OTHER GOVERNMENT

## 2023-07-07 ENCOUNTER — ANESTHESIA (OUTPATIENT)
Dept: ENDOSCOPY | Age: 63
End: 2023-07-07
Payer: OTHER GOVERNMENT

## 2023-07-07 VITALS
RESPIRATION RATE: 18 BRPM | BODY MASS INDEX: 27.97 KG/M2 | HEART RATE: 66 BPM | DIASTOLIC BLOOD PRESSURE: 86 MMHG | HEIGHT: 62 IN | SYSTOLIC BLOOD PRESSURE: 128 MMHG | WEIGHT: 152 LBS | OXYGEN SATURATION: 99 % | TEMPERATURE: 97.1 F

## 2023-07-07 DIAGNOSIS — Z12.11 SCREENING FOR COLON CANCER: ICD-10-CM

## 2023-07-07 PROCEDURE — 7100000011 HC PHASE II RECOVERY - ADDTL 15 MIN: Performed by: SURGERY

## 2023-07-07 PROCEDURE — 2580000003 HC RX 258

## 2023-07-07 PROCEDURE — 3609027000 HC COLONOSCOPY: Performed by: SURGERY

## 2023-07-07 PROCEDURE — 7100000010 HC PHASE II RECOVERY - FIRST 15 MIN: Performed by: SURGERY

## 2023-07-07 PROCEDURE — 2709999900 HC NON-CHARGEABLE SUPPLY: Performed by: SURGERY

## 2023-07-07 PROCEDURE — 6360000002 HC RX W HCPCS

## 2023-07-07 PROCEDURE — 45378 DIAGNOSTIC COLONOSCOPY: CPT | Performed by: SURGERY

## 2023-07-07 PROCEDURE — 3700000000 HC ANESTHESIA ATTENDED CARE: Performed by: SURGERY

## 2023-07-07 RX ORDER — SODIUM CHLORIDE 9 MG/ML
INJECTION, SOLUTION INTRAVENOUS CONTINUOUS
Status: DISCONTINUED | OUTPATIENT
Start: 2023-07-07 | End: 2023-07-07 | Stop reason: HOSPADM

## 2023-07-07 RX ORDER — SODIUM CHLORIDE 9 MG/ML
INJECTION, SOLUTION INTRAVENOUS CONTINUOUS PRN
Status: DISCONTINUED | OUTPATIENT
Start: 2023-07-07 | End: 2023-07-07 | Stop reason: SDUPTHER

## 2023-07-07 RX ORDER — PROPOFOL 10 MG/ML
INJECTION, EMULSION INTRAVENOUS PRN
Status: DISCONTINUED | OUTPATIENT
Start: 2023-07-07 | End: 2023-07-07 | Stop reason: SDUPTHER

## 2023-07-07 RX ADMIN — SODIUM CHLORIDE: 9 INJECTION, SOLUTION INTRAVENOUS at 11:07

## 2023-07-07 RX ADMIN — PROPOFOL 340 MG: 10 INJECTION, EMULSION INTRAVENOUS at 11:08

## 2023-07-07 ASSESSMENT — LIFESTYLE VARIABLES: SMOKING_STATUS: 0

## 2023-07-07 ASSESSMENT — PAIN - FUNCTIONAL ASSESSMENT: PAIN_FUNCTIONAL_ASSESSMENT: 0-10

## 2023-07-07 NOTE — ANESTHESIA PRE PROCEDURE
Department of Anesthesiology  Preprocedure Note       Name:  Marleni Mancilla   Age:  66 y.o.  :  1960                                          MRN:  [id number]         Date:  2023      Surgeon: Freddie Kulkarni):  Giulia Chapman MD    Procedure: Procedure(s):  COLORECTAL CANCER SCREENING, NOT HIGH RISK    Medications prior to admission:   Prior to Admission medications    Medication Sig Start Date End Date Taking? Authorizing Provider   loratadine (CLARITIN) 10 MG tablet Take 1 tablet by mouth daily   Yes Historical Provider, MD   omeprazole (PRILOSEC) 40 MG delayed release capsule Take 1 capsule by mouth daily as needed (acid reflux) 2/15/23   Lesia Aguilar MD   azelastine (OPTIVAR) 0.05 % ophthalmic solution INSTILL 1 DROP IN BOTH EYES TWICE DAILY 22   Historical Provider, MD   BIOTIN PO Take by mouth    Historical Provider, MD   calcium citrate-vitamin D (CITRICAL + D) 315-250 MG-UNIT TABS per tablet Take 1 tablet by mouth daily (with breakfast)    Historical Provider, MD   acetaminophen (TYLENOL) 325 MG tablet Take 2 tablets by mouth every 6 hours as needed for Pain    Historical Provider, MD   denosumab (PROLIA) 60 MG/ML SOSY SC injection Inject 1 mL into the skin once for 1 dose  Patient taking differently: Inject 1 mL into the skin once Indications: every 6 mos.  next dose 23  Amanda Negrete DO   calcium carbonate (OSCAL) 500 MG TABS tablet Take 600 mg by mouth daily    Historical Provider, MD   Multiple Vitamins-Minerals (CENTRUM SILVER PO) Take by mouth    Historical Provider, MD   Polyethylene Glycol 3350 (MIRALAX PO) Take by mouth as needed    Historical Provider, MD   Magnesium 400 MG CAPS Take 400 mg by mouth daily    Historical Provider, MD   Cholecalciferol 2000 UNITS CAPS Take by mouth    Historical Provider, MD   Inulin (FIBER CHOICE PO) Take by mouth every other day     Historical Provider, MD       Current medications:    No current

## 2023-07-07 NOTE — ANESTHESIA POSTPROCEDURE EVALUATION
Department of Anesthesiology  Postprocedure Note    Patient: Laura Carver  MRN: [id number]  YOB: 1960  Date of evaluation: 7/7/2023      Procedure Summary     Date: 07/07/23 Room / Location: [facility] / [facility]    Anesthesia Start: 1107 Anesthesia Stop: 1119    Procedure: COLORECTAL CANCER SCREENING, NOT HIGH RISK Diagnosis:       Screening for colon cancer      (Screening for colon cancer [Z12.11])    Surgeons: Herbert Ray MD Responsible Provider: Katie Mares MD    Anesthesia Type: MAC ASA Status: 2          Anesthesia Type: No value filed.     Mario Phase I: Mario Score: 10    Mario Phase II: Mario Score: 10      Anesthesia Post Evaluation    Patient location during evaluation: PACU  Patient participation: complete - patient participated  Level of consciousness: awake  Airway patency: patent  Nausea & Vomiting: no nausea and no vomiting  Complications: no  Cardiovascular status: hemodynamically stable  Respiratory status: acceptable  Hydration status: stable

## 2023-07-07 NOTE — H&P
Patient's office history and physical was reviewed. Patient examined. There has been no change in the patient's history and physical.      Physician Signature: Electronically signed by Dr. Jimena Guerra Surgery History and Physical     Patient's Name/Date of Birth: Daria Mancia / 1960     Date: 7/7/23     PCP: Ileana Mann MD     Referring Physician:   Ileana Mann MD  [phone]     CHIEF COMPLAINT:         Chief Complaint   Patient presents with    New Patient    Colonoscopy       consult            HISTORY OF PRESENT ILLNESS:     Daria Mancia is an 66 y.o. female who presents for a colonoscopy. The patient has chronic constipation. She said she has been taking fiber for years. She said she has become tolerant to different medications over the years. She has taken laxatives over the years. She has been taking 800 mg Magnesium and that gave her diarrhea. She is now taking magnesium 400 mg daily and she is also taking probiotics. No nausea, vomiting, diarrhea. No changes in stool caliber. No bloody or black stools. No abdominal pain. No unintentional weight loss. No family history of colon cancer. The patient has a known history of: no known risk factors. The patient has had a colonoscopy before - 10 years ago and was normal per the patient.      Past Medical History:   Past Medical History        Past Medical History:   Diagnosis Date    Ascending aorta enlargement ([address])       no current issues- cardiology signed off 2019 (see Epic notes)     Chronic sinusitis       for OR 3-29-21     GERD (gastroesophageal reflux disease)      Headache      Hiatal hernia      Osteoporosis              Past Surgical History:   Past Surgical History         Past Surgical History:   Procedure Laterality Date    COLONOSCOPY   2013     Dr Cary Diamond   2007     Dr. Bello Camacho   10/1995    MYOMECTOMY   03/1996    SHOULDER SURGERY Right 07/2002    SHOULDER SURGERY

## 2023-07-07 NOTE — DISCHARGE INSTRUCTIONS
[address] [facility] PROCEDURE DISCHARGE INSTRUCTIONS  You may be drowsy or lightheaded after receiving sedation or anesthesia. A responsible person should be with you for the next 24 hours. Please follow the instructions checked below:    DIET INSTRUCTIONS:  [x]Start with light diet and progress to your normal diet as you feel like eating. If you experience nausea or repeated episodes of vomiting which persist beyond 12-24 hours, notify your doctor. []Other     ACTIVITY INSTRUCTIONS:  [x]Rest today. Increase activity as tolerated    []No heavy lifting or strenuous activity     [x]No driving for today  []Other      MEDICATION INSTRUCTIONS:    []Prescriptions sent with you. Use as directed. When taking pain medications, you may experience dizziness or drowsiness. Do not drink alcohol or drive when taking these medications. [x]Continue preop medications                               Post-procedure Care   If any tissue was removed: It will be sent to a lab to be examined. It may take 1-2 weeks for results. The doctor will usually give an initial report after the scope is removed. Other tests may be recommended. A small amount of bleeding may occur during the first few days after the procedure. When you return home after the procedure, be sure to follow your doctor's instructions, which may include:   Resume medicines as instructed by your doctor. Resume normal diet, unless directed otherwise by your doctor. The sedative will make you drowsy. Avoid driving, operating machinery, or making important decisions for the rest of the day. Rest for the remainder of the day. After arriving home, contact your doctor if any of the following occurs:   Bleeding from your rectum, notify your doctor if you pass a teaspoonful of blood or more.    Black, tarry stools   Severe abdominal pain   Hard, swollen abdomen   Signs of infection, including fever or chills   Inability to pass gas or

## 2023-07-07 NOTE — OP NOTE
Operative Note: Colonoscopy    Sonia Vail     DATE OF PROCEDURE: 7/7/2023  SURGEON: Dr. Lisseth Emerson MD, M.D. PREOPERATIVE DIAGNOSES:    Screening    POSTOPERATIVE DIAGNOSES:  Moderate sigmoid diverticulosis    SPECIMENS:  * No specimens in log *     OPERATION:   Colonoscopy to the cecum      ANESTHESIA: LMAC    COMPLICATIONS: None. BLOOD LOSS: Minimal    Procedure Note:    CONSENT AND INDICATIONS:  This is a 66y.o. year old female who is having a screening colonoscopy today. I have discussed with the patient and/or the patient representative the indication, alternatives, and the possible risks and/or complications of the planned procedure and the anesthesia methods. The patient and/or patient representative appear to understand and agree to proceed. OPERATIONS: Bowel prep was done yesterday until the bowels were clear. The patient was placed on the table and sedated by anesthesia. A rectal exam was performed and no mass was felt. A lubricated scope was passed into the rectum which looked normal.  The scope was passed all the way around through the sigmoid, descending, transverse and ascending colon to the cecum. The bowel prep was clear. There was moderate sigmoid diverticulosis seen. The cecum was identified by the appendiceal orifice, ileocecal valve, and light reflex in the RLQ. The scope was then slowly withdrawn, each area was examined again on the way out. No other abnormalities were seen. The scope was retroflexed in the rectum and it was normal . The patient tolerated the procedure well. PLAN:    Follow up colonoscopy in 10 years. Physician Signature: Electronically signed by Dr. Lisseth Emerson MD M.D. FACS    Send copy of H&P to PCP, Marty Acevedo MD and referring physician, No ref.  provider found

## 2023-07-18 ENCOUNTER — TELEPHONE (OUTPATIENT)
Dept: FAMILY MEDICINE CLINIC | Age: 63
End: 2023-07-18

## 2023-07-18 DIAGNOSIS — R73.01 IMPAIRED FASTING GLUCOSE: ICD-10-CM

## 2023-07-18 DIAGNOSIS — E78.5 HYPERLIPIDEMIA, UNSPECIFIED HYPERLIPIDEMIA TYPE: Primary | ICD-10-CM

## 2023-07-18 NOTE — TELEPHONE ENCOUNTER
Ky Goodwin from [facility] called regarding pt. Ky Buddy stated that she accidentally released lab work of pt's CMP, CBC< hemoglobin, A! C, and lipid. Ky Buddy said she needed them to resent for when pt comes back in. Please Advise.

## 2023-07-25 ENCOUNTER — HOSPITAL ENCOUNTER (OUTPATIENT)
Dept: INFUSION THERAPY | Age: 63
Setting detail: INFUSION SERIES
Discharge: HOME | End: 2023-07-25
Payer: OTHER GOVERNMENT

## 2023-07-25 VITALS
BODY MASS INDEX: 27.97 KG/M2 | SYSTOLIC BLOOD PRESSURE: 127 MMHG | RESPIRATION RATE: 18 BRPM | HEART RATE: 72 BPM | TEMPERATURE: 97.7 F | DIASTOLIC BLOOD PRESSURE: 92 MMHG | HEIGHT: 62 IN | WEIGHT: 152 LBS | OXYGEN SATURATION: 99 %

## 2023-07-25 DIAGNOSIS — M85.80 OSTEOPENIA, UNSPECIFIED LOCATION: Primary | ICD-10-CM

## 2023-07-25 PROCEDURE — 96372 THER/PROPH/DIAG INJ SC/IM: CPT

## 2023-07-25 PROCEDURE — 6360000002 HC RX W HCPCS: Performed by: OBSTETRICS & GYNECOLOGY

## 2023-07-25 RX ADMIN — DENOSUMAB 60 MG: 60 INJECTION SUBCUTANEOUS at 13:14

## 2023-07-25 ASSESSMENT — PAIN DESCRIPTION - PAIN TYPE: TYPE: CHRONIC PAIN

## 2023-07-25 ASSESSMENT — PAIN SCALES - GENERAL: PAINLEVEL_OUTOF10: 6

## 2023-07-25 ASSESSMENT — PAIN DESCRIPTION - DESCRIPTORS: DESCRIPTORS: ACHING

## 2023-07-25 ASSESSMENT — PAIN DESCRIPTION - LOCATION: LOCATION: FOOT

## 2023-07-25 ASSESSMENT — PAIN DESCRIPTION - ORIENTATION: ORIENTATION: RIGHT;LEFT

## 2023-07-25 ASSESSMENT — PAIN DESCRIPTION - FREQUENCY: FREQUENCY: CONTINUOUS

## 2023-07-30 ENCOUNTER — OFFICE VISIT (OUTPATIENT)
Dept: FAMILY MEDICINE CLINIC | Age: 63
End: 2023-07-30

## 2023-07-30 VITALS
TEMPERATURE: 97.5 F | BODY MASS INDEX: 26.58 KG/M2 | WEIGHT: 150 LBS | OXYGEN SATURATION: 98 % | HEIGHT: 63 IN | DIASTOLIC BLOOD PRESSURE: 82 MMHG | RESPIRATION RATE: 16 BRPM | HEART RATE: 84 BPM | SYSTOLIC BLOOD PRESSURE: 126 MMHG

## 2023-07-30 DIAGNOSIS — M54.6 ACUTE RIGHT-SIDED THORACIC BACK PAIN: ICD-10-CM

## 2023-07-30 DIAGNOSIS — R39.9 UTI SYMPTOMS: Primary | ICD-10-CM

## 2023-07-30 LAB
BILIRUBIN, POC: NEGATIVE
BLOOD URINE, POC: NEGATIVE
CLARITY, POC: CLEAR
COLOR, POC: YELLOW
GLUCOSE URINE, POC: NEGATIVE
KETONES, POC: NEGATIVE
LEUKOCYTE EST, POC: NEGATIVE
NITRITE, POC: NEGATIVE
PH, POC: 7
PROTEIN, POC: NEGATIVE
SPECIFIC GRAVITY, POC: 1.01
UROBILINOGEN, POC: 0.2

## 2023-07-30 RX ORDER — CYCLOBENZAPRINE HCL 10 MG
10 TABLET ORAL NIGHTLY PRN
Qty: 30 TABLET | Refills: 0 | Status: SHIPPED | OUTPATIENT
Start: 2023-07-30 | End: 2023-08-29

## 2023-07-30 RX ORDER — SULFAMETHOXAZOLE AND TRIMETHOPRIM 800; 160 MG/1; MG/1
1 TABLET ORAL 2 TIMES DAILY
Qty: 6 TABLET | Refills: 0 | Status: SHIPPED | OUTPATIENT
Start: 2023-07-30 | End: 2023-08-02

## 2023-07-30 RX ORDER — METHYLPREDNISOLONE 4 MG/1
TABLET ORAL
Qty: 1 KIT | Refills: 0 | Status: SHIPPED | OUTPATIENT
Start: 2023-07-30

## 2023-07-30 RX ORDER — KETOROLAC TROMETHAMINE 30 MG/ML
30 INJECTION, SOLUTION INTRAMUSCULAR; INTRAVENOUS ONCE
Status: COMPLETED | OUTPATIENT
Start: 2023-07-30 | End: 2023-07-30

## 2023-07-30 RX ORDER — TRIAMCINOLONE ACETONIDE 40 MG/ML
40 INJECTION, SUSPENSION INTRA-ARTICULAR; INTRAMUSCULAR ONCE
Status: COMPLETED | OUTPATIENT
Start: 2023-07-30 | End: 2023-07-30

## 2023-07-30 RX ADMIN — TRIAMCINOLONE ACETONIDE 40 MG: 40 INJECTION, SUSPENSION INTRA-ARTICULAR; INTRAMUSCULAR at 08:52

## 2023-07-30 RX ADMIN — KETOROLAC TROMETHAMINE 30 MG: 30 INJECTION, SOLUTION INTRAMUSCULAR; INTRAVENOUS at 08:51

## 2023-07-30 NOTE — PROGRESS NOTES
Chief Complaint:   Back Pain (L sided back pain radiating into RLQ x 3 days)      History of Present Illness   Source of history provided by:  patient. Yobany Wasserman is a 66 y.o. old female who has a past medical history of:   Past Medical History:   Diagnosis Date    Ascending aorta enlargement ([address])     no current issues- cardiology signed off 2019 (see Epic notes)     GERD (gastroesophageal reflux disease)     Headache     Hiatal hernia     Osteoporosis     Presents to the walk in clinic with complaints of dysuria x 1 days. Reports associated frequency, urgency, and suprapubic pressure. Denies gross hematuria. There is associated flank pain. Denies any fever, chills, vaginal discharge, vaginal bleeding, possibility of pregnancy, vomiting, or lethargy. Patient's last menstrual period was 01/22/2016. She had a colonoscopy July 7. She started to have back pain but it improved with motrin. On Wednesday when she was pulling weeds her back started hurting again. She has never had kidney stones but her mother and sister both have. She states her UTIs do progress quickly. She did have some diarrhea this morning. Back Pain  Patient presents for evaluation of low back problems. Symptoms have been present for  on and off 2  weeks and include  pain in thoracic back on right side . Initial inciting event: none. Alleviating factors identifiable by the patient are  ice and motrin . Aggravating factors identifiable by the patient are bending forwards. Treatments initiated by the patient:  ice and motrin .      ROS    Unless otherwise stated in this report or unable to obtain because of the patient's clinical or mental status as evidenced by the medical record, this patients's positive and negative responses for Review of Systems, constitutional, psych, eyes, ENT, cardiovascular, respiratory, gastrointestinal, neurological, genitourinary, musculoskeletal, integument systems and systems related to the presenting

## 2023-08-24 DIAGNOSIS — E78.5 HYPERLIPIDEMIA, UNSPECIFIED HYPERLIPIDEMIA TYPE: ICD-10-CM

## 2023-08-24 DIAGNOSIS — R73.01 IMPAIRED FASTING GLUCOSE: ICD-10-CM

## 2023-08-24 LAB
ABSOLUTE IMMATURE GRANULOCYTE: <0.03 K/UL (ref 0–0.58)
ALBUMIN SERPL-MCNC: 4.6 G/DL (ref 3.5–5.2)
ALP BLD-CCNC: 36 U/L (ref 35–104)
ALT SERPL-CCNC: 15 U/L (ref 0–32)
ANION GAP SERPL CALCULATED.3IONS-SCNC: 16 MMOL/L (ref 7–16)
AST SERPL-CCNC: 18 U/L (ref 0–31)
BASOPHILS ABSOLUTE: 0.03 K/UL (ref 0–0.2)
BASOPHILS RELATIVE PERCENT: 1 % (ref 0–2)
BILIRUB SERPL-MCNC: 0.3 MG/DL (ref 0–1.2)
BUN BLDV-MCNC: 10 MG/DL (ref 6–23)
CALCIUM SERPL-MCNC: 9.2 MG/DL (ref 8.6–10.2)
CHLORIDE BLD-SCNC: 104 MMOL/L (ref 98–107)
CHOLESTEROL: 257 MG/DL
CO2: 20 MMOL/L (ref 22–29)
CREAT SERPL-MCNC: 0.7 MG/DL (ref 0.5–1)
EOSINOPHILS ABSOLUTE: 0.09 K/UL (ref 0.05–0.5)
EOSINOPHILS RELATIVE PERCENT: 2 % (ref 0–6)
GFR SERPL CREATININE-BSD FRML MDRD: >60 ML/MIN/1.73M2
GLUCOSE BLD-MCNC: 90 MG/DL (ref 74–99)
HBA1C MFR BLD: 5.5 % (ref 4–5.6)
HCT VFR BLD CALC: 43.9 % (ref 34–48)
HDLC SERPL-MCNC: 92 MG/DL
HEMOGLOBIN: 14.1 G/DL (ref 11.5–15.5)
IMMATURE GRANULOCYTES: 0 % (ref 0–5)
LDL CHOLESTEROL: 152 MG/DL
LYMPHOCYTES ABSOLUTE: 2.07 K/UL (ref 1.5–4)
LYMPHOCYTES RELATIVE PERCENT: 50 % (ref 20–42)
MCH RBC QN AUTO: 31.1 PG (ref 26–35)
MCHC RBC AUTO-ENTMCNC: 32.1 G/DL (ref 32–34.5)
MCV RBC AUTO: 96.7 FL (ref 80–99.9)
MONOCYTES ABSOLUTE: 0.37 K/UL (ref 0.1–0.95)
MONOCYTES RELATIVE PERCENT: 9 % (ref 2–12)
NEUTROPHILS ABSOLUTE: 1.56 K/UL (ref 1.8–7.3)
NEUTROPHILS RELATIVE PERCENT: 38 % (ref 43–80)
PDW BLD-RTO: 13 % (ref 11.5–15)
PLATELET # BLD: 242 K/UL (ref 130–450)
PMV BLD AUTO: 10.5 FL (ref 7–12)
POTASSIUM SERPL-SCNC: 4.1 MMOL/L (ref 3.5–5)
RBC # BLD: 4.54 M/UL (ref 3.5–5.5)
SODIUM BLD-SCNC: 140 MMOL/L (ref 132–146)
TOTAL PROTEIN: 7 G/DL (ref 6.4–8.3)
TRIGL SERPL-MCNC: 65 MG/DL
VLDLC SERPL CALC-MCNC: 13 MG/DL
WBC # BLD: 4.1 K/UL (ref 4.5–11.5)

## 2023-08-30 ENCOUNTER — OFFICE VISIT (OUTPATIENT)
Dept: FAMILY MEDICINE CLINIC | Age: 63
End: 2023-08-30
Payer: OTHER GOVERNMENT

## 2023-08-30 VITALS
RESPIRATION RATE: 16 BRPM | HEIGHT: 63 IN | TEMPERATURE: 97.7 F | SYSTOLIC BLOOD PRESSURE: 128 MMHG | BODY MASS INDEX: 25.96 KG/M2 | DIASTOLIC BLOOD PRESSURE: 78 MMHG | HEART RATE: 74 BPM | WEIGHT: 146.5 LBS | OXYGEN SATURATION: 99 %

## 2023-08-30 DIAGNOSIS — Z00.00 ANNUAL PHYSICAL EXAM: Primary | ICD-10-CM

## 2023-08-30 DIAGNOSIS — E78.5 HYPERLIPIDEMIA, UNSPECIFIED HYPERLIPIDEMIA TYPE: ICD-10-CM

## 2023-08-30 PROCEDURE — 99396 PREV VISIT EST AGE 40-64: CPT | Performed by: FAMILY MEDICINE

## 2023-08-30 NOTE — PROGRESS NOTES
Annual exam:  Patient is here for routine yearly physical/preventative visit. Patient has no complaints or concerns today. Patient is  generally healthy. Chronic medical conditions are generally controlled. Most recent labs reviewed with patient and  are remarkable. Health maintenance reviewed with patient and is  up to date. Overall doing well. Her derm wants her to take Plaquenil.     Past Medical History:   Diagnosis Date    Ascending aorta enlargement ([address])     no current issues- cardiology signed off 2019 (see Epic notes)     GERD (gastroesophageal reflux disease)     Headache     Hiatal hernia     Osteoporosis       Past Surgical History:   Procedure Laterality Date    COLONOSCOPY  2013    Dr Prasanth Lentz  2007    Dr. Farida Woods    COLONOSCOPY N/A 7/7/2023    COLORECTAL CANCER SCREENING, NOT HIGH RISK performed by Bowen Schmidt MD at [address] [place] [address]  10/1995    MYOMECTOMY  03/1996    SHOULDER SURGERY Right 07/2002    SHOULDER SURGERY Left 09/2002    SINUS ENDOSCOPY N/A 3/29/2021    FUNCTIONAL ENDOSCOPIC SINUS SURGERY SEPTOPLASTY AND SUBMUCOSAL RESECTION OF INFERIOR TURBINOPLASTY performed by Bethel Nielson DO at [address] N/A 6/11/2018    EGD BIOPSY performed by Dearl Skiff, MD at [facility] ENDOSCOPY      Family History   Problem Relation Age of Onset    Diabetes Mother     High Blood Pressure Mother     Other Mother         brain tumor    Heart Disease Father 48        CABG x 4 (twice)    Cancer Father 66        esophageal    Diabetes Father     High Cholesterol Father     Other Sister 54        MS    High Cholesterol Sister     Osteoporosis Sister     High Cholesterol Sister     Mult Sclerosis Sister     Osteoporosis Sister     Other Sister         MS    Other Other         autoimmune disease     Social History     Socioeconomic History    Marital status:      Spouse name: Not on file    Number of

## 2023-09-07 ENCOUNTER — OFFICE VISIT (OUTPATIENT)
Dept: PODIATRY | Age: 63
End: 2023-09-07
Payer: OTHER GOVERNMENT

## 2023-09-07 VITALS
DIASTOLIC BLOOD PRESSURE: 72 MMHG | WEIGHT: 148 LBS | SYSTOLIC BLOOD PRESSURE: 110 MMHG | TEMPERATURE: 97.8 F | BODY MASS INDEX: 26.64 KG/M2

## 2023-09-07 DIAGNOSIS — M79.672 PAIN IN BOTH FEET: Primary | ICD-10-CM

## 2023-09-07 DIAGNOSIS — G89.29 CHRONIC PAIN OF LEFT ANKLE: ICD-10-CM

## 2023-09-07 DIAGNOSIS — G89.29 CHRONIC PAIN OF RIGHT ANKLE: ICD-10-CM

## 2023-09-07 DIAGNOSIS — M25.572 CHRONIC PAIN OF LEFT ANKLE: ICD-10-CM

## 2023-09-07 DIAGNOSIS — M79.671 PAIN IN BOTH FEET: Primary | ICD-10-CM

## 2023-09-07 DIAGNOSIS — M25.571 CHRONIC PAIN OF RIGHT ANKLE: ICD-10-CM

## 2023-09-07 PROCEDURE — 99214 OFFICE O/P EST MOD 30 MIN: CPT | Performed by: PODIATRIST

## 2023-09-07 RX ORDER — PREDNISONE 10 MG/1
10 TABLET ORAL
Qty: 9 TABLET | Refills: 0 | Status: SHIPPED | OUTPATIENT
Start: 2023-09-07 | End: 2023-09-16

## 2023-09-07 RX ORDER — HYDROXYCHLOROQUINE SULFATE 200 MG/1
200 TABLET, FILM COATED ORAL 2 TIMES DAILY
COMMUNITY
Start: 2023-09-02

## 2023-09-09 NOTE — PROGRESS NOTES
23  Yobany Wasserman : 1960 Sex: female  Age: 66 y.o. Patient was referred by Brandi Anderson MD    Chief Complaint   Patient presents with    Foot Pain    Ankle Pain     New, intermittent pain to both feet and ankles  Patient requesting Xrays to both feet and both ankles. Pt does have back issues sciatica        SUBJECTIVE patient is seen today for discomfort and pain in bilateral foot and ankles no true trauma pain comes and goes progressively getting worse.   Foot Pain     Ankle Pain       Review of Systems  Const: Denies constitutional symptoms  Musculo: Denies symptoms other than stated above  Skin: Denies symptoms other than stated above       Current Outpatient Medications:     predniSONE (DELTASONE) 10 MG tablet, Take 1 tablet by mouth daily for 9 days 3 tabs  Qd x 3  days   2 tabs qd x 3 days  1 tab qd  X 3 days, Disp: 9 tablet, Rfl: 0    loratadine (CLARITIN) 10 MG tablet, Take 1 tablet by mouth daily, Disp: , Rfl:     omeprazole (PRILOSEC) 40 MG delayed release capsule, Take 1 capsule by mouth daily as needed (acid reflux), Disp: 90 capsule, Rfl: 3    azelastine (OPTIVAR) 0.05 % ophthalmic solution, INSTILL 1 DROP IN BOTH EYES TWICE DAILY, Disp: , Rfl:     BIOTIN PO, Take by mouth, Disp: , Rfl:     calcium citrate-vitamin D (CITRICAL + D) 315-250 MG-UNIT TABS per tablet, Take 1 tablet by mouth daily (with breakfast), Disp: , Rfl:     acetaminophen (TYLENOL) 325 MG tablet, Take 2 tablets by mouth every 6 hours as needed for Pain, Disp: , Rfl:     Multiple Vitamins-Minerals (CENTRUM SILVER PO), Take by mouth, Disp: , Rfl:     Polyethylene Glycol 3350 (MIRALAX PO), Take by mouth as needed, Disp: , Rfl:     Magnesium 400 MG CAPS, Take 400 mg by mouth daily, Disp: , Rfl:     Cholecalciferol 2000 UNITS CAPS, Take by mouth, Disp: , Rfl:     Inulin (FIBER CHOICE PO), Take by mouth every other day , Disp: , Rfl:     hydroxychloroquine (PLAQUENIL) 200 MG tablet, Take 1 tablet by mouth in the

## 2023-09-28 ENCOUNTER — TELEPHONE (OUTPATIENT)
Dept: FAMILY MEDICINE CLINIC | Age: 63
End: 2023-09-28

## 2023-09-28 NOTE — TELEPHONE ENCOUNTER
Luma Espinoza and Danitza Porter are both still sick since 9/14/23 Danitza Porter had Covid but Luma Espinoza was negative and they are still sick-it keeps moving from head ears throat sinuses-the do have a cough-nothing OTC has been working-Carey tested negative today but they both would like to come in together, if they are unable to if you think they should go to [facility] they will-please advise

## 2023-09-29 ENCOUNTER — OFFICE VISIT (OUTPATIENT)
Dept: FAMILY MEDICINE CLINIC | Age: 63
End: 2023-09-29
Payer: OTHER GOVERNMENT

## 2023-09-29 VITALS
TEMPERATURE: 97.5 F | SYSTOLIC BLOOD PRESSURE: 126 MMHG | HEIGHT: 63 IN | BODY MASS INDEX: 26.38 KG/M2 | DIASTOLIC BLOOD PRESSURE: 88 MMHG | HEART RATE: 86 BPM | WEIGHT: 148.9 LBS | RESPIRATION RATE: 16 BRPM | OXYGEN SATURATION: 97 %

## 2023-09-29 DIAGNOSIS — J06.9 ACUTE UPPER RESPIRATORY INFECTION: Primary | ICD-10-CM

## 2023-09-29 PROCEDURE — 99213 OFFICE O/P EST LOW 20 MIN: CPT | Performed by: FAMILY MEDICINE

## 2023-09-29 RX ORDER — AZITHROMYCIN 250 MG/1
TABLET, FILM COATED ORAL
Qty: 6 TABLET | Refills: 0 | Status: SHIPPED | OUTPATIENT
Start: 2023-09-29 | End: 2023-10-09

## 2023-09-29 RX ORDER — BENZONATATE 200 MG/1
200 CAPSULE ORAL 3 TIMES DAILY PRN
Qty: 21 CAPSULE | Refills: 0 | Status: SHIPPED | OUTPATIENT
Start: 2023-09-29 | End: 2023-10-06

## 2023-10-20 ENCOUNTER — NURSE ONLY (OUTPATIENT)
Dept: FAMILY MEDICINE CLINIC | Age: 63
End: 2023-10-20
Payer: OTHER GOVERNMENT

## 2023-10-20 DIAGNOSIS — Z23 NEED FOR INFLUENZA VACCINATION: Primary | ICD-10-CM

## 2023-10-20 PROCEDURE — 90471 IMMUNIZATION ADMIN: CPT | Performed by: FAMILY MEDICINE

## 2023-10-20 PROCEDURE — 90674 CCIIV4 VAC NO PRSV 0.5 ML IM: CPT | Performed by: FAMILY MEDICINE

## 2023-10-25 ENCOUNTER — TELEPHONE (OUTPATIENT)
Dept: ADMINISTRATIVE | Age: 63
End: 2023-10-25

## 2023-10-25 NOTE — TELEPHONE ENCOUNTER
Pt called and said she spoke with the billing dept with [facility], and they told her to contact the office directly regarding claims that are being denied by [facility].  They are saying that Dr. Deni Hu went from in network to out of network. She was unsure why they told her to call the office. She will call whoever she needs to but just needs some direction. Please contact pt. She said to leave her a message if she does not answer.

## 2023-11-30 ENCOUNTER — HOSPITAL ENCOUNTER (OUTPATIENT)
Dept: GENERAL RADIOLOGY | Age: 63
Discharge: HOME | End: 2023-12-02
Payer: OTHER GOVERNMENT

## 2023-11-30 ENCOUNTER — OFFICE VISIT (OUTPATIENT)
Dept: FAMILY MEDICINE CLINIC | Age: 63
End: 2023-11-30
Payer: OTHER GOVERNMENT

## 2023-11-30 ENCOUNTER — HOSPITAL ENCOUNTER (OUTPATIENT)
Age: 63
Discharge: HOME | End: 2023-12-02
Payer: OTHER GOVERNMENT

## 2023-11-30 VITALS
TEMPERATURE: 97.8 F | OXYGEN SATURATION: 96 % | DIASTOLIC BLOOD PRESSURE: 80 MMHG | BODY MASS INDEX: 27.77 KG/M2 | RESPIRATION RATE: 18 BRPM | SYSTOLIC BLOOD PRESSURE: 140 MMHG | HEIGHT: 62 IN | WEIGHT: 150.9 LBS | HEART RATE: 96 BPM

## 2023-11-30 DIAGNOSIS — M54.16 LUMBAR RADICULOPATHY: ICD-10-CM

## 2023-11-30 DIAGNOSIS — M54.16 LUMBAR RADICULOPATHY: Primary | ICD-10-CM

## 2023-11-30 PROCEDURE — 99213 OFFICE O/P EST LOW 20 MIN: CPT | Performed by: FAMILY MEDICINE

## 2023-11-30 PROCEDURE — 72110 X-RAY EXAM L-2 SPINE 4/>VWS: CPT

## 2023-11-30 PROCEDURE — 96372 THER/PROPH/DIAG INJ SC/IM: CPT | Performed by: FAMILY MEDICINE

## 2023-11-30 RX ORDER — METHYLPREDNISOLONE 4 MG/1
TABLET ORAL
Qty: 1 KIT | Refills: 0 | Status: SHIPPED | OUTPATIENT
Start: 2023-11-30 | End: 2023-12-06

## 2023-11-30 RX ORDER — KETOROLAC TROMETHAMINE 30 MG/ML
60 INJECTION, SOLUTION INTRAMUSCULAR; INTRAVENOUS ONCE
Status: COMPLETED | OUTPATIENT
Start: 2023-11-30 | End: 2023-11-30

## 2023-11-30 RX ADMIN — KETOROLAC TROMETHAMINE 60 MG: 30 INJECTION, SOLUTION INTRAMUSCULAR; INTRAVENOUS at 12:05

## 2023-12-07 DIAGNOSIS — M41.9 SCOLIOSIS, UNSPECIFIED SCOLIOSIS TYPE, UNSPECIFIED SPINAL REGION: Primary | ICD-10-CM

## 2023-12-15 ENCOUNTER — HOSPITAL ENCOUNTER (OUTPATIENT)
Dept: MRI IMAGING | Age: 63
Discharge: HOME | End: 2023-12-15
Attending: FAMILY MEDICINE
Payer: OTHER GOVERNMENT

## 2023-12-15 DIAGNOSIS — M41.9 SCOLIOSIS, UNSPECIFIED SCOLIOSIS TYPE, UNSPECIFIED SPINAL REGION: ICD-10-CM

## 2023-12-15 PROCEDURE — 72148 MRI LUMBAR SPINE W/O DYE: CPT

## 2024-01-02 ENCOUNTER — OFFICE VISIT (OUTPATIENT)
Dept: PHYSICAL MEDICINE AND REHAB | Age: 64
End: 2024-01-02
Payer: OTHER GOVERNMENT

## 2024-01-02 VITALS
HEIGHT: 62 IN | SYSTOLIC BLOOD PRESSURE: 142 MMHG | HEART RATE: 90 BPM | DIASTOLIC BLOOD PRESSURE: 88 MMHG | BODY MASS INDEX: 29.44 KG/M2 | WEIGHT: 160 LBS

## 2024-01-02 DIAGNOSIS — M47.816 LUMBAR SPONDYLOSIS: ICD-10-CM

## 2024-01-02 DIAGNOSIS — M54.50 CHRONIC BILATERAL LOW BACK PAIN WITHOUT SCIATICA: Primary | ICD-10-CM

## 2024-01-02 DIAGNOSIS — G89.29 CHRONIC BILATERAL LOW BACK PAIN WITHOUT SCIATICA: Primary | ICD-10-CM

## 2024-01-02 DIAGNOSIS — M79.18 MYOFASCIAL PAIN: ICD-10-CM

## 2024-01-02 PROCEDURE — 99204 OFFICE O/P NEW MOD 45 MIN: CPT | Performed by: PHYSICAL MEDICINE & REHABILITATION

## 2024-01-02 RX ORDER — IBUPROFEN 600 MG/1
600 TABLET ORAL EVERY 6 HOURS PRN
COMMUNITY

## 2024-01-02 NOTE — PROGRESS NOTES
Shannon Skaggs, DO  [facility]  [address] [place] [address]  Phone: [phone]  Fax: [phone]    PCP: Neema Napoles MD  Date of visit: 1/2/24    Chief Complaint   Patient presents with    Back Pain     New patient        Dear Dr. Napoles,     Thank you for referring your patient to be seen.    As you know,  Carey Vail is a 63 y.o. female with past medical history as below who presents with low back pain since October. She spent two days in ER in a chair with her  who suffered a heart attack. She had to do yard work end of October and pain worsened. The pain is rated Pain Score:   7, is described as achy, and is located in the low back pain with radiation to the right leg to knee. The symptoms have been unchanged since onset.  The pain is better with nothing.  The pain is worse with  sitting, standing . There is associated numbness/tingling in toes, middle toes on right. There is no weakness. There is no bowel/bladder changes.     The prior workup has included: Xray, MRI    The prior treatment has included:  PT: none  Chiropractic: none    Modalities: none  OTC Tylenol: XS  NSAIDS: advil   Opioids: none  Membrane stabilizers: none  Muscle relaxers: none    Previous injections: none    Previous surgery at this site: none    Allergies   Allergen Reactions    Benadryl [Diphenhydramine] Anaphylaxis    Minocycline Nausea Only     dizziness    Vicodin [Hydrocodone-Acetaminophen] Nausea And Vomiting       Current Outpatient Medications   Medication Sig Dispense Refill    ibuprofen (ADVIL;MOTRIN) 600 MG tablet Take 1 tablet by mouth every 6 hours as needed for Pain      hydroxychloroquine (PLAQUENIL) 200 MG tablet Take 1 tablet by mouth in the morning and at bedtime      loratadine (CLARITIN) 10 MG tablet Take 1 tablet by mouth daily      omeprazole (PRILOSEC) 40 MG delayed release capsule Take 1 capsule by mouth daily as

## 2024-02-22 DIAGNOSIS — E78.5 HYPERLIPIDEMIA, UNSPECIFIED HYPERLIPIDEMIA TYPE: ICD-10-CM

## 2024-02-22 LAB
ALBUMIN SERPL-MCNC: 4.5 G/DL (ref 3.5–5.2)
ALP BLD-CCNC: 39 U/L (ref 35–104)
ALT SERPL-CCNC: 15 U/L (ref 0–32)
ANION GAP SERPL CALCULATED.3IONS-SCNC: 11 MMOL/L (ref 7–16)
AST SERPL-CCNC: 21 U/L (ref 0–31)
BILIRUB SERPL-MCNC: 0.3 MG/DL (ref 0–1.2)
BUN BLDV-MCNC: 9 MG/DL (ref 6–23)
CALCIUM SERPL-MCNC: 9.2 MG/DL (ref 8.6–10.2)
CHLORIDE BLD-SCNC: 103 MMOL/L (ref 98–107)
CHOLESTEROL: 241 MG/DL
CO2: 27 MMOL/L (ref 22–29)
CREAT SERPL-MCNC: 0.7 MG/DL (ref 0.5–1)
GFR SERPL CREATININE-BSD FRML MDRD: >60 ML/MIN/1.73M2
GLUCOSE BLD-MCNC: 94 MG/DL (ref 74–99)
HCT VFR BLD CALC: 45.9 % (ref 34–48)
HDLC SERPL-MCNC: 84 MG/DL
HEMOGLOBIN: 14.8 G/DL (ref 11.5–15.5)
LDL CHOLESTEROL: 147 MG/DL
MCH RBC QN AUTO: 30.6 PG (ref 26–35)
MCHC RBC AUTO-ENTMCNC: 32.2 G/DL (ref 32–34.5)
MCV RBC AUTO: 94.8 FL (ref 80–99.9)
PDW BLD-RTO: 12.4 % (ref 11.5–15)
PLATELET # BLD: 273 K/UL (ref 130–450)
PMV BLD AUTO: 10.6 FL (ref 7–12)
POTASSIUM SERPL-SCNC: 4.4 MMOL/L (ref 3.5–5)
RBC # BLD: 4.84 M/UL (ref 3.5–5.5)
SODIUM BLD-SCNC: 141 MMOL/L (ref 132–146)
TOTAL PROTEIN: 6.9 G/DL (ref 6.4–8.3)
TRIGL SERPL-MCNC: 52 MG/DL
VLDLC SERPL CALC-MCNC: 10 MG/DL
WBC # BLD: 3.9 K/UL (ref 4.5–11.5)

## 2024-02-29 ENCOUNTER — OFFICE VISIT (OUTPATIENT)
Dept: FAMILY MEDICINE CLINIC | Age: 64
End: 2024-02-29
Payer: OTHER GOVERNMENT

## 2024-02-29 VITALS
BODY MASS INDEX: 26.1 KG/M2 | OXYGEN SATURATION: 99 % | HEIGHT: 63 IN | DIASTOLIC BLOOD PRESSURE: 78 MMHG | WEIGHT: 147.3 LBS | HEART RATE: 98 BPM | TEMPERATURE: 97.5 F | RESPIRATION RATE: 16 BRPM | SYSTOLIC BLOOD PRESSURE: 132 MMHG

## 2024-02-29 DIAGNOSIS — E78.2 MIXED HYPERLIPIDEMIA: Primary | ICD-10-CM

## 2024-02-29 DIAGNOSIS — K21.9 GASTROESOPHAGEAL REFLUX DISEASE, UNSPECIFIED WHETHER ESOPHAGITIS PRESENT: ICD-10-CM

## 2024-02-29 PROCEDURE — 99214 OFFICE O/P EST MOD 30 MIN: CPT | Performed by: FAMILY MEDICINE

## 2024-02-29 RX ORDER — OMEPRAZOLE 40 MG/1
40 CAPSULE, DELAYED RELEASE ORAL DAILY PRN
Qty: 90 CAPSULE | Refills: 3 | Status: SHIPPED | OUTPATIENT
Start: 2024-02-29

## 2024-02-29 SDOH — ECONOMIC STABILITY: INCOME INSECURITY: HOW HARD IS IT FOR YOU TO PAY FOR THE VERY BASICS LIKE FOOD, HOUSING, MEDICAL CARE, AND HEATING?: NOT HARD AT ALL

## 2024-02-29 SDOH — ECONOMIC STABILITY: HOUSING INSECURITY
IN THE LAST 12 MONTHS, WAS THERE A TIME WHEN YOU DID NOT HAVE A STEADY PLACE TO SLEEP OR SLEPT IN A SHELTER (INCLUDING NOW)?: NO

## 2024-02-29 SDOH — ECONOMIC STABILITY: FOOD INSECURITY: WITHIN THE PAST 12 MONTHS, YOU WORRIED THAT YOUR FOOD WOULD RUN OUT BEFORE YOU GOT MONEY TO BUY MORE.: NEVER TRUE

## 2024-02-29 SDOH — ECONOMIC STABILITY: FOOD INSECURITY: WITHIN THE PAST 12 MONTHS, THE FOOD YOU BOUGHT JUST DIDN'T LAST AND YOU DIDN'T HAVE MONEY TO GET MORE.: NEVER TRUE

## 2024-02-29 ASSESSMENT — PATIENT HEALTH QUESTIONNAIRE - PHQ9
SUM OF ALL RESPONSES TO PHQ QUESTIONS 1-9: 0
SUM OF ALL RESPONSES TO PHQ9 QUESTIONS 1 & 2: 0
SUM OF ALL RESPONSES TO PHQ QUESTIONS 1-9: 0
1. LITTLE INTEREST OR PLEASURE IN DOING THINGS: 0
2. FEELING DOWN, DEPRESSED OR HOPELESS: 0
SUM OF ALL RESPONSES TO PHQ QUESTIONS 1-9: 0
SUM OF ALL RESPONSES TO PHQ QUESTIONS 1-9: 0

## 2024-02-29 NOTE — PROGRESS NOTES
Hyperlipidemia:  Patient is here to follow up regarding chronic hyperlipidemia.  This is  generally controlled.  Treatment includes diet.  Patient is  compliant with lifestyle modifications.  Patient is not a smoker.  Most recent labs reviewed with patient today and are remarkable.  Comorbid conditions include none.  Patient states she has pressure in her ears and some sinus pressure with change of weather.    Lab Results   Component Value Date    LDLCALC 132 (H) 02/09/2023    LDLCHOLESTEROL 147 (H) 02/22/2024       Patient's past medical, surgical, social and/or family history reviewed, updated in chart, and are non-contributory (unless otherwise stated).  Medications and allergies also reviewed and updated in chart.      Review of Systems:  Constitutional:  No fever, no fatigue, no chills, no headaches, no weight change  Dermatology:  No rash, no mole, no dry or sensitive skin  ENT:  No cough, no sore throat, no sinus pain, no runny nose, no ear pain  Cardiology:  No chest pain, no palpitations, no leg edema, no shortness of breath, no PND  Gastroenterology:  No dysphagia, no abdominal pain, no nausea, no vomiting, no constipation, no diarrhea, no heartburn  Musculoskeletal:  No joint pain, no leg cramps, no back pain, no muscle aches  Respiratory:  No shortness of breath, no orthopnea, no wheezing, no CAMARGO, no hemoptysis  Urology:  No blood in the urine, no urinary frequency, no urinary incontinence, no urinary urgency, no nocturia, no dysuria  Vitals:    02/29/24 1401   BP: 132/78   Site: Right Upper Arm   Pulse: 98   Resp: 16   Temp: 97.5 °F (36.4 °C)   TempSrc: Temporal   SpO2: 99%   Weight: 66.8 kg (147 lb 4.8 oz)   Height: 1.588 m (5' 2.5\")       General:  Patient alert and oriented x 3, NAD, pleasant  HEENT:  Atraumatic, normocephalic, PERRLA, EOMI, clear conjunctiva, TMs clear, nose-clear, throat - no erythema  Neck:  Supple, no goiter, no carotid bruits, no lymphadenopathy  Lungs:  CTA B  Heart:

## 2024-08-22 DIAGNOSIS — E78.2 MIXED HYPERLIPIDEMIA: ICD-10-CM

## 2024-08-22 LAB
ALBUMIN: 4.2 G/DL (ref 3.5–5.2)
ALP BLD-CCNC: 92 U/L (ref 35–104)
ALT SERPL-CCNC: 15 U/L (ref 0–32)
ANION GAP SERPL CALCULATED.3IONS-SCNC: 14 MMOL/L (ref 7–16)
AST SERPL-CCNC: 20 U/L (ref 0–31)
BILIRUB SERPL-MCNC: 0.4 MG/DL (ref 0–1.2)
BUN BLDV-MCNC: 13 MG/DL (ref 6–23)
CALCIUM SERPL-MCNC: 9.4 MG/DL (ref 8.6–10.2)
CHLORIDE BLD-SCNC: 102 MMOL/L (ref 98–107)
CHOLESTEROL, TOTAL: 239 MG/DL
CO2: 26 MMOL/L (ref 22–29)
CREAT SERPL-MCNC: 0.7 MG/DL (ref 0.5–1)
GFR, ESTIMATED: >90 ML/MIN/1.73M2
GLUCOSE BLD-MCNC: 101 MG/DL (ref 74–99)
HCT VFR BLD CALC: 43.6 % (ref 34–48)
HDLC SERPL-MCNC: 88 MG/DL
HEMOGLOBIN: 14.3 G/DL (ref 11.5–15.5)
LDL CHOLESTEROL: 140 MG/DL
MCH RBC QN AUTO: 31.5 PG (ref 26–35)
MCHC RBC AUTO-ENTMCNC: 32.8 G/DL (ref 32–34.5)
MCV RBC AUTO: 96 FL (ref 80–99.9)
PDW BLD-RTO: 12.8 % (ref 11.5–15)
PLATELET # BLD: 238 K/UL (ref 130–450)
PMV BLD AUTO: 10.8 FL (ref 7–12)
POTASSIUM SERPL-SCNC: 3.9 MMOL/L (ref 3.5–5)
RBC # BLD: 4.54 M/UL (ref 3.5–5.5)
SODIUM BLD-SCNC: 142 MMOL/L (ref 132–146)
TOTAL PROTEIN: 6.8 G/DL (ref 6.4–8.3)
TRIGL SERPL-MCNC: 53 MG/DL
TSH SERPL DL<=0.05 MIU/L-ACNC: 3.22 UIU/ML (ref 0.27–4.2)
VLDLC SERPL CALC-MCNC: 11 MG/DL
WBC # BLD: 4.7 K/UL (ref 4.5–11.5)

## 2024-08-29 ENCOUNTER — OFFICE VISIT (OUTPATIENT)
Dept: FAMILY MEDICINE CLINIC | Age: 64
End: 2024-08-29
Payer: OTHER GOVERNMENT

## 2024-08-29 VITALS
HEIGHT: 62 IN | SYSTOLIC BLOOD PRESSURE: 132 MMHG | TEMPERATURE: 98.5 F | RESPIRATION RATE: 16 BRPM | OXYGEN SATURATION: 94 % | WEIGHT: 145 LBS | DIASTOLIC BLOOD PRESSURE: 84 MMHG | HEART RATE: 58 BPM | BODY MASS INDEX: 26.68 KG/M2

## 2024-08-29 DIAGNOSIS — E78.2 MIXED HYPERLIPIDEMIA: Primary | ICD-10-CM

## 2024-08-29 PROCEDURE — 99213 OFFICE O/P EST LOW 20 MIN: CPT | Performed by: FAMILY MEDICINE

## 2024-08-29 NOTE — PROGRESS NOTES
Hyperlipidemia:  Patient is here to follow up regarding chronic hyperlipidemia.  This is  generally controlled.  Treatment includes diet.  Patient is  compliant with lifestyle modifications.  Patient is not a smoker.  Most recent labs reviewed with patient today and are remarkable.  Comorbid conditions include none.  Stopped Plaquenil in April. Her GA got worse. It stopped it from spreading. She has concerns about it. She feels like her joints are bothering her more now.     Lab Results   Component Value Date     (H) 08/22/2024        Patient's past medical, surgical, social and/or family history reviewed, updated in chart, and are non-contributory (unless otherwise stated).  Medications and allergies also reviewed and updated in chart.      Review of Systems:  Constitutional:  No fever, no fatigue, no chills, no headaches, no weight change  Dermatology:  No rash, no mole, no dry or sensitive skin  ENT:  No cough, no sore throat, no sinus pain, no runny nose, no ear pain  Cardiology:  No chest pain, no palpitations, no leg edema, no shortness of breath, no PND  Gastroenterology:  No dysphagia, no abdominal pain, no nausea, no vomiting, no constipation, no diarrhea, no heartburn  Musculoskeletal:  No joint pain, no leg cramps, no back pain, no muscle aches  Respiratory:  No shortness of breath, no orthopnea, no wheezing, no CAMARGO, no hemoptysis  Urology:  No blood in the urine, no urinary frequency, no urinary incontinence, no urinary urgency, no nocturia, no dysuria  Vitals:    08/29/24 1310   BP: 132/84   Site: Left Upper Arm   Pulse: 58   Resp: 16   Temp: 98.5 °F (36.9 °C)   SpO2: 94%   Weight: 65.8 kg (145 lb)   Height: 1.575 m (5' 2\")       General:  Patient alert and oriented x 3, NAD, pleasant  HEENT:  Atraumatic, normocephalic, PERRLA, EOMI, clear conjunctiva, TMs clear, nose-clear, throat - no erythema  Neck:  Supple, no goiter, no carotid bruits, no lymphadenopathy  Lungs:  CTA B  Heart:  RRR, no

## 2024-10-22 ENCOUNTER — NURSE ONLY (OUTPATIENT)
Dept: FAMILY MEDICINE CLINIC | Age: 64
End: 2024-10-22
Payer: OTHER GOVERNMENT

## 2024-10-22 PROCEDURE — 90661 CCIIV3 VAC ABX FR 0.5 ML IM: CPT | Performed by: FAMILY MEDICINE

## 2024-10-22 PROCEDURE — 90471 IMMUNIZATION ADMIN: CPT | Performed by: FAMILY MEDICINE

## 2025-01-09 ENCOUNTER — OFFICE VISIT (OUTPATIENT)
Dept: FAMILY MEDICINE CLINIC | Age: 65
End: 2025-01-09
Payer: OTHER GOVERNMENT

## 2025-01-09 VITALS
SYSTOLIC BLOOD PRESSURE: 122 MMHG | RESPIRATION RATE: 18 BRPM | DIASTOLIC BLOOD PRESSURE: 68 MMHG | OXYGEN SATURATION: 98 % | HEART RATE: 88 BPM | WEIGHT: 156 LBS | HEIGHT: 62 IN | BODY MASS INDEX: 28.71 KG/M2 | TEMPERATURE: 97.7 F

## 2025-01-09 DIAGNOSIS — R05.9 COUGH, UNSPECIFIED TYPE: ICD-10-CM

## 2025-01-09 DIAGNOSIS — J01.90 ACUTE SINUSITIS, RECURRENCE NOT SPECIFIED, UNSPECIFIED LOCATION: Primary | ICD-10-CM

## 2025-01-09 LAB
Lab: NORMAL
PERFORMING INSTRUMENT: NORMAL
QC PASS/FAIL: NORMAL
SARS-COV-2, POC: NORMAL

## 2025-01-09 PROCEDURE — 99214 OFFICE O/P EST MOD 30 MIN: CPT

## 2025-01-09 PROCEDURE — 87426 SARSCOV CORONAVIRUS AG IA: CPT

## 2025-01-09 RX ORDER — HYDROXYCHLOROQUINE SULFATE 200 MG/1
TABLET ORAL
COMMUNITY
Start: 2024-02-27

## 2025-01-09 RX ORDER — CEFDINIR 300 MG/1
300 CAPSULE ORAL EVERY 12 HOURS
Qty: 20 CAPSULE | Refills: 0 | Status: SHIPPED | OUTPATIENT
Start: 2025-01-09 | End: 2025-01-19

## 2025-01-09 RX ORDER — PREDNISONE 10 MG/1
TABLET ORAL
Qty: 18 TABLET | Refills: 0 | Status: SHIPPED | OUTPATIENT
Start: 2025-01-09 | End: 2025-01-17

## 2025-01-09 NOTE — PROGRESS NOTES
Chief Complaint       Congestion, Cough, Facial Swelling, and Headache      History of Present Illness   Source of history provided by:  patient.    History of Present Illness  The patient is a 64-year-old female presenting for evaluation of congestion with sinus pressure.    She has a history of sinus complications, including a previous sinus surgery and polyp removal. She reports experiencing dental discomfort with weather changes, which she attributes to her sinus condition. She also reports an earache and radiating pain to her right eye, which she believes is causing some fatigue in this eye. These symptoms have been present for the past few days. Upon awakening, she noticed discomfort in her left ear and is uncertain if the symptoms are extending into her throat. She does not report any influenza-like symptoms such as fever, chills, or body aches. She has not monitored her temperature at home. Her COVID-19 test was negative. She has been self-medicating with Claritin, but it has not provided relief.    She experienced a sinus headache yesterday, which was managed with extra strength Tylenol.    ALLERGIES  She is allergic to BENADRYL and MINOCYCLINE.    MEDICATIONS  Current: Claritin, extra strength Tylenol    All other ROS negative unless otherwise stated in HPI.      ROS    Unless otherwise stated in this report or unable to obtain because of the patient's clinical or mental status as evidenced by the medical record, this patients's positive and negative responses for Review of Systems, constitutional, psych, eyes, ENT, cardiovascular, respiratory, gastrointestinal, neurological, genitourinary, musculoskeletal, integument systems and systems related to the presenting problem are either stated in the preceding or were not pertinent or were negative for the symptoms and/or complaints related to the medical problem.      Physical Exam         VS:  /68   Pulse 88   Temp 97.7 °F (36.5 °C)   Resp 18

## 2025-02-20 DIAGNOSIS — E78.2 MIXED HYPERLIPIDEMIA: ICD-10-CM

## 2025-02-20 LAB
ALBUMIN: 4.5 G/DL (ref 3.5–5.2)
ALP BLD-CCNC: 85 U/L (ref 35–104)
ALT SERPL-CCNC: 18 U/L (ref 0–32)
ANION GAP SERPL CALCULATED.3IONS-SCNC: 13 MMOL/L (ref 7–16)
AST SERPL-CCNC: 24 U/L (ref 0–31)
BILIRUB SERPL-MCNC: 0.4 MG/DL (ref 0–1.2)
BUN BLDV-MCNC: 11 MG/DL (ref 6–23)
CALCIUM SERPL-MCNC: 9.3 MG/DL (ref 8.6–10.2)
CHLORIDE BLD-SCNC: 104 MMOL/L (ref 98–107)
CHOLESTEROL, TOTAL: 246 MG/DL
CO2: 26 MMOL/L (ref 22–29)
CREAT SERPL-MCNC: 0.7 MG/DL (ref 0.5–1)
GFR, ESTIMATED: >90 ML/MIN/1.73M2
GLUCOSE BLD-MCNC: 89 MG/DL (ref 74–99)
HCT VFR BLD CALC: 44.9 % (ref 34–48)
HDLC SERPL-MCNC: 95 MG/DL
HEMOGLOBIN: 14.7 G/DL (ref 11.5–15.5)
LDL CHOLESTEROL: 140 MG/DL
MCH RBC QN AUTO: 30.8 PG (ref 26–35)
MCHC RBC AUTO-ENTMCNC: 32.7 G/DL (ref 32–34.5)
MCV RBC AUTO: 93.9 FL (ref 80–99.9)
PDW BLD-RTO: 12.4 % (ref 11.5–15)
PLATELET # BLD: 245 K/UL (ref 130–450)
PMV BLD AUTO: 10.9 FL (ref 7–12)
POTASSIUM SERPL-SCNC: 4.6 MMOL/L (ref 3.5–5)
RBC # BLD: 4.78 M/UL (ref 3.5–5.5)
SODIUM BLD-SCNC: 143 MMOL/L (ref 132–146)
TOTAL PROTEIN: 6.8 G/DL (ref 6.4–8.3)
TRIGL SERPL-MCNC: 54 MG/DL
TSH SERPL DL<=0.05 MIU/L-ACNC: 3.05 UIU/ML (ref 0.27–4.2)
VLDLC SERPL CALC-MCNC: 11 MG/DL
WBC # BLD: 3.5 K/UL (ref 4.5–11.5)

## 2025-02-28 ENCOUNTER — OFFICE VISIT (OUTPATIENT)
Dept: FAMILY MEDICINE CLINIC | Age: 65
End: 2025-02-28
Payer: OTHER GOVERNMENT

## 2025-02-28 VITALS
HEIGHT: 63 IN | SYSTOLIC BLOOD PRESSURE: 124 MMHG | DIASTOLIC BLOOD PRESSURE: 64 MMHG | TEMPERATURE: 97.6 F | BODY MASS INDEX: 26.58 KG/M2 | OXYGEN SATURATION: 98 % | HEART RATE: 80 BPM | WEIGHT: 150 LBS

## 2025-02-28 DIAGNOSIS — M79.10 MYALGIA: Primary | ICD-10-CM

## 2025-02-28 DIAGNOSIS — R09.81 SINUS CONGESTION: ICD-10-CM

## 2025-02-28 DIAGNOSIS — L92.0 GRANULOMA ANNULARE: ICD-10-CM

## 2025-02-28 DIAGNOSIS — E78.2 MIXED HYPERLIPIDEMIA: ICD-10-CM

## 2025-02-28 DIAGNOSIS — M79.10 MYALGIA: ICD-10-CM

## 2025-02-28 PROCEDURE — 99214 OFFICE O/P EST MOD 30 MIN: CPT | Performed by: FAMILY MEDICINE

## 2025-02-28 PROCEDURE — 90471 IMMUNIZATION ADMIN: CPT | Performed by: FAMILY MEDICINE

## 2025-02-28 PROCEDURE — 90677 PCV20 VACCINE IM: CPT | Performed by: FAMILY MEDICINE

## 2025-02-28 SDOH — ECONOMIC STABILITY: FOOD INSECURITY: WITHIN THE PAST 12 MONTHS, YOU WORRIED THAT YOUR FOOD WOULD RUN OUT BEFORE YOU GOT MONEY TO BUY MORE.: NEVER TRUE

## 2025-02-28 SDOH — ECONOMIC STABILITY: FOOD INSECURITY: WITHIN THE PAST 12 MONTHS, THE FOOD YOU BOUGHT JUST DIDN'T LAST AND YOU DIDN'T HAVE MONEY TO GET MORE.: NEVER TRUE

## 2025-02-28 ASSESSMENT — PATIENT HEALTH QUESTIONNAIRE - PHQ9
SUM OF ALL RESPONSES TO PHQ QUESTIONS 1-9: 0
2. FEELING DOWN, DEPRESSED OR HOPELESS: NOT AT ALL
1. LITTLE INTEREST OR PLEASURE IN DOING THINGS: NOT AT ALL
SUM OF ALL RESPONSES TO PHQ QUESTIONS 1-9: 0

## 2025-02-28 NOTE — PROGRESS NOTES
Carey Vail (:  1960) is a 65 y.o. female, Established patient, here for evaluation of the following chief complaint(s):  Hyperlipidemia (6 month f/u), things to discuss (Coconut oil - does that get absorbed when used internally/Depo injections causing brain tumors - used for 8 years/ ), Sinus Problem (Having lots of sinus issues has had surgery - feels pressure but can't blow anything out), and tennis elbow    The 10-year ASCVD risk score (Destiny MELGOZA, et al., 2019) is: 3.6%    Values used to calculate the score:      Age: 65 years      Sex: Female      Is Non- : No      Diabetic: No      Tobacco smoker: No      Systolic Blood Pressure: 124 mmHg      Is BP treated: No      HDL Cholesterol: 95 mg/dL      Total Cholesterol: 246 mg/dL       Assessment & Plan  1. Numbness in the arm and hand.  The numbness appears to be positional, occurring when she is sitting and watching TV. It does not occur during exercise or when gripping weights. She is advised to reduce the weight of her dumbbells to 1 pound and monitor for any changes.    2. Sinus issues.  She exhibits signs of chronic drainage, which may be exacerbated by the use of Claritin. The dryness could be contributing to her perceived congestion. She is advised to discontinue Claritin and use a saline nasal spray over the weekend. Additionally, she should run a humidifier in her room at night and close the door to maintain humidity levels. She is instructed to provide an update on her condition by Monday. If there is no improvement, a prescription for Astelin nasal spray will be considered.    3. Granuloma annulare.  Her previous blood work for autoimmune and RA was conducted in 2023, yielding negative results. However, these markers can fluctuate, and given her current symptoms, it is prudent to repeat the tests. Additional blood work, including SANDY, CRP, sed rate, rheumatoid factor, and CK, will be ordered.    4.

## 2025-03-01 LAB
C-REACTIVE PROTEIN: <3 MG/L (ref 0–5)
RHEUMATOID FACTOR: <10 IU/ML (ref 0–13)
SED RATE, AUTOMATED: 2 MM/HR (ref 0–20)
TOTAL CK: 113 U/L (ref 20–180)

## 2025-03-03 LAB
ANTI-NUCLEAR ANTIBODY (ANA): NEGATIVE
CCP IGG ANTIBODIES: 0.7 U/ML (ref 0–7)

## 2025-03-04 RX ORDER — AZELASTINE 1 MG/ML
2 SPRAY, METERED NASAL 2 TIMES DAILY
Qty: 120 ML | Refills: 1 | Status: SHIPPED
Start: 2025-03-04 | End: 2025-03-11 | Stop reason: SDUPTHER

## 2025-03-11 RX ORDER — AZELASTINE 1 MG/ML
2 SPRAY, METERED NASAL 2 TIMES DAILY
Qty: 120 ML | Refills: 3 | Status: SHIPPED
Start: 2025-03-11 | End: 2025-03-11 | Stop reason: SDUPTHER

## 2025-03-11 RX ORDER — AZELASTINE 1 MG/ML
2 SPRAY, METERED NASAL 2 TIMES DAILY
Qty: 120 ML | Refills: 3 | Status: SHIPPED | OUTPATIENT
Start: 2025-03-11

## 2025-03-20 ENCOUNTER — TELEPHONE (OUTPATIENT)
Dept: FAMILY MEDICINE CLINIC | Age: 65
End: 2025-03-20

## 2025-03-20 NOTE — TELEPHONE ENCOUNTER
[facility] won't fill Azelastine due to a potential interaction. Patient thinks its benadryl.    Patient said [facility] requested info twice.

## 2025-03-26 NOTE — TELEPHONE ENCOUNTER
Spoke to patient , [facility] states that they have faxed a form over for us to fill out, advised pt there is nothing as of today pertaining to the medication , pt states she will have form faxed over again   1:58 pm pt called back and was advised that they will not fill it due to estevan allergy   [phone] is the number to the pharmacy.  Would you like another medication ordered?

## 2025-04-01 RX ORDER — AZELASTINE 1 MG/ML
2 SPRAY, METERED NASAL 2 TIMES DAILY
Qty: 120 ML | Refills: 3 | Status: SHIPPED | OUTPATIENT
Start: 2025-04-01

## 2025-06-03 LAB
MAMMOGRAPHY, EXTERNAL: NORMAL
PAP SMEAR, EXTERNAL: NORMAL

## 2025-06-14 ENCOUNTER — APPOINTMENT (OUTPATIENT)
Dept: CT IMAGING | Age: 65
End: 2025-06-14
Payer: OTHER GOVERNMENT

## 2025-06-14 ENCOUNTER — APPOINTMENT (OUTPATIENT)
Dept: GENERAL RADIOLOGY | Age: 65
End: 2025-06-14
Payer: OTHER GOVERNMENT

## 2025-06-14 ENCOUNTER — HOSPITAL ENCOUNTER (EMERGENCY)
Age: 65
Discharge: ANOTHER HOSPITAL | End: 2025-06-16
Attending: STUDENT IN AN ORGANIZED HEALTH CARE EDUCATION/TRAINING PROGRAM
Payer: OTHER GOVERNMENT

## 2025-06-14 DIAGNOSIS — I63.81 LACUNAR STROKE (HCC): Primary | ICD-10-CM

## 2025-06-14 LAB
ALBUMIN SERPL-MCNC: 4.3 G/DL (ref 3.5–5.2)
ALP SERPL-CCNC: 80 U/L (ref 35–104)
ALT SERPL-CCNC: 23 U/L (ref 0–32)
ANION GAP SERPL CALCULATED.3IONS-SCNC: 13 MMOL/L (ref 7–16)
AST SERPL-CCNC: 25 U/L (ref 0–31)
BASOPHILS # BLD: 0.03 K/UL (ref 0–0.2)
BASOPHILS NFR BLD: 1 % (ref 0–2)
BILIRUB SERPL-MCNC: 0.2 MG/DL (ref 0–1.2)
BUN SERPL-MCNC: 12 MG/DL (ref 6–23)
CALCIUM SERPL-MCNC: 8.7 MG/DL (ref 8.6–10.2)
CHLORIDE SERPL-SCNC: 102 MMOL/L (ref 98–107)
CO2 SERPL-SCNC: 25 MMOL/L (ref 22–29)
CREAT SERPL-MCNC: 0.7 MG/DL (ref 0.5–1)
EOSINOPHIL # BLD: 0.07 K/UL (ref 0.05–0.5)
EOSINOPHILS RELATIVE PERCENT: 2 % (ref 0–6)
ERYTHROCYTE [DISTWIDTH] IN BLOOD BY AUTOMATED COUNT: 12.4 % (ref 11.5–15)
GFR, ESTIMATED: >90 ML/MIN/1.73M2
GLUCOSE SERPL-MCNC: 185 MG/DL (ref 74–99)
HCT VFR BLD AUTO: 40.4 % (ref 34–48)
HGB BLD-MCNC: 13.8 G/DL (ref 11.5–15.5)
IMM GRANULOCYTES # BLD AUTO: <0.03 K/UL (ref 0–0.58)
IMM GRANULOCYTES NFR BLD: 0 % (ref 0–5)
LYMPHOCYTES NFR BLD: 1.71 K/UL (ref 1.5–4)
LYMPHOCYTES RELATIVE PERCENT: 37 % (ref 20–42)
MAGNESIUM SERPL-MCNC: 2.1 MG/DL (ref 1.6–2.6)
MCH RBC QN AUTO: 30.9 PG (ref 26–35)
MCHC RBC AUTO-ENTMCNC: 34.2 G/DL (ref 32–34.5)
MCV RBC AUTO: 90.4 FL (ref 80–99.9)
MONOCYTES NFR BLD: 0.41 K/UL (ref 0.1–0.95)
MONOCYTES NFR BLD: 9 % (ref 2–12)
NEUTROPHILS NFR BLD: 52 % (ref 43–80)
NEUTS SEG NFR BLD: 2.39 K/UL (ref 1.8–7.3)
PLATELET # BLD AUTO: 233 K/UL (ref 130–450)
PMV BLD AUTO: 10.2 FL (ref 7–12)
POTASSIUM SERPL-SCNC: 3.5 MMOL/L (ref 3.5–5)
PROT SERPL-MCNC: 6.5 G/DL (ref 6.4–8.3)
RBC # BLD AUTO: 4.47 M/UL (ref 3.5–5.5)
SODIUM SERPL-SCNC: 140 MMOL/L (ref 132–146)
TROPONIN I SERPL HS-MCNC: <6 NG/L (ref 0–14)
WBC OTHER # BLD: 4.6 K/UL (ref 4.5–11.5)

## 2025-06-14 PROCEDURE — 93005 ELECTROCARDIOGRAM TRACING: CPT | Performed by: STUDENT IN AN ORGANIZED HEALTH CARE EDUCATION/TRAINING PROGRAM

## 2025-06-14 PROCEDURE — 85025 COMPLETE CBC W/AUTO DIFF WBC: CPT

## 2025-06-14 PROCEDURE — 71045 X-RAY EXAM CHEST 1 VIEW: CPT

## 2025-06-14 PROCEDURE — 2580000003 HC RX 258

## 2025-06-14 PROCEDURE — 70450 CT HEAD/BRAIN W/O DYE: CPT

## 2025-06-14 PROCEDURE — 96361 HYDRATE IV INFUSION ADD-ON: CPT

## 2025-06-14 PROCEDURE — 6360000004 HC RX CONTRAST MEDICATION: Performed by: RADIOLOGY

## 2025-06-14 PROCEDURE — 6360000002 HC RX W HCPCS

## 2025-06-14 PROCEDURE — 96374 THER/PROPH/DIAG INJ IV PUSH: CPT

## 2025-06-14 PROCEDURE — 6370000000 HC RX 637 (ALT 250 FOR IP)

## 2025-06-14 PROCEDURE — 70498 CT ANGIOGRAPHY NECK: CPT

## 2025-06-14 PROCEDURE — 84484 ASSAY OF TROPONIN QUANT: CPT

## 2025-06-14 PROCEDURE — 80053 COMPREHEN METABOLIC PANEL: CPT

## 2025-06-14 PROCEDURE — 70496 CT ANGIOGRAPHY HEAD: CPT

## 2025-06-14 PROCEDURE — 83735 ASSAY OF MAGNESIUM: CPT

## 2025-06-14 PROCEDURE — 99285 EMERGENCY DEPT VISIT HI MDM: CPT

## 2025-06-14 RX ORDER — 0.9 % SODIUM CHLORIDE 0.9 %
1000 INTRAVENOUS SOLUTION INTRAVENOUS ONCE
Status: COMPLETED | OUTPATIENT
Start: 2025-06-14 | End: 2025-06-14

## 2025-06-14 RX ORDER — IOPAMIDOL 755 MG/ML
75 INJECTION, SOLUTION INTRAVASCULAR
Status: COMPLETED | OUTPATIENT
Start: 2025-06-14 | End: 2025-06-14

## 2025-06-14 RX ORDER — ONDANSETRON 2 MG/ML
4 INJECTION INTRAMUSCULAR; INTRAVENOUS ONCE
Status: COMPLETED | OUTPATIENT
Start: 2025-06-14 | End: 2025-06-14

## 2025-06-14 RX ORDER — CLOPIDOGREL BISULFATE 75 MG/1
300 TABLET ORAL ONCE
Status: COMPLETED | OUTPATIENT
Start: 2025-06-14 | End: 2025-06-15

## 2025-06-14 RX ORDER — ASPIRIN 81 MG/1
324 TABLET, CHEWABLE ORAL ONCE
Status: COMPLETED | OUTPATIENT
Start: 2025-06-14 | End: 2025-06-15

## 2025-06-14 RX ADMIN — IOPAMIDOL 75 ML: 755 INJECTION, SOLUTION INTRAVENOUS at 21:31

## 2025-06-14 RX ADMIN — LIDOCAINE HYDROCHLORIDE: 20 SOLUTION ORAL at 19:37

## 2025-06-14 RX ADMIN — SODIUM CHLORIDE 1000 ML: 0.9 INJECTION, SOLUTION INTRAVENOUS at 19:27

## 2025-06-14 RX ADMIN — ONDANSETRON 4 MG: 2 INJECTION, SOLUTION INTRAMUSCULAR; INTRAVENOUS at 19:35

## 2025-06-14 ASSESSMENT — PAIN SCALES - GENERAL: PAINLEVEL_OUTOF10: 6

## 2025-06-14 ASSESSMENT — LIFESTYLE VARIABLES
HOW OFTEN DO YOU HAVE A DRINK CONTAINING ALCOHOL: NEVER
HOW MANY STANDARD DRINKS CONTAINING ALCOHOL DO YOU HAVE ON A TYPICAL DAY: PATIENT DOES NOT DRINK

## 2025-06-14 ASSESSMENT — PAIN - FUNCTIONAL ASSESSMENT: PAIN_FUNCTIONAL_ASSESSMENT: 0-10

## 2025-06-15 ENCOUNTER — APPOINTMENT (OUTPATIENT)
Dept: MRI IMAGING | Age: 65
End: 2025-06-15
Payer: OTHER GOVERNMENT

## 2025-06-15 PROBLEM — I63.9 ACUTE CVA (CEREBROVASCULAR ACCIDENT) (HCC): Status: ACTIVE | Noted: 2025-06-15

## 2025-06-15 PROBLEM — G45.9 TIA (TRANSIENT ISCHEMIC ATTACK): Status: ACTIVE | Noted: 2025-06-15

## 2025-06-15 PROCEDURE — 6370000000 HC RX 637 (ALT 250 FOR IP): Performed by: STUDENT IN AN ORGANIZED HEALTH CARE EDUCATION/TRAINING PROGRAM

## 2025-06-15 PROCEDURE — 6370000000 HC RX 637 (ALT 250 FOR IP)

## 2025-06-15 PROCEDURE — 6360000002 HC RX W HCPCS: Performed by: STUDENT IN AN ORGANIZED HEALTH CARE EDUCATION/TRAINING PROGRAM

## 2025-06-15 PROCEDURE — 96375 TX/PRO/DX INJ NEW DRUG ADDON: CPT

## 2025-06-15 PROCEDURE — 70551 MRI BRAIN STEM W/O DYE: CPT

## 2025-06-15 RX ORDER — PROCHLORPERAZINE EDISYLATE 5 MG/ML
5 INJECTION INTRAMUSCULAR; INTRAVENOUS EVERY 6 HOURS PRN
Status: CANCELLED | OUTPATIENT
Start: 2025-06-15

## 2025-06-15 RX ORDER — ACETAMINOPHEN 325 MG/1
650 TABLET ORAL EVERY 4 HOURS PRN
Status: DISCONTINUED | OUTPATIENT
Start: 2025-06-15 | End: 2025-06-16 | Stop reason: HOSPADM

## 2025-06-15 RX ORDER — ACETAMINOPHEN 325 MG/1
650 TABLET ORAL EVERY 6 HOURS PRN
Status: CANCELLED | OUTPATIENT
Start: 2025-06-15

## 2025-06-15 RX ORDER — MAGNESIUM SULFATE IN WATER 40 MG/ML
2000 INJECTION, SOLUTION INTRAVENOUS PRN
Status: CANCELLED | OUTPATIENT
Start: 2025-06-15

## 2025-06-15 RX ORDER — BISACODYL 10 MG
10 SUPPOSITORY, RECTAL RECTAL DAILY PRN
Status: CANCELLED | OUTPATIENT
Start: 2025-06-15

## 2025-06-15 RX ORDER — ACETAMINOPHEN 650 MG/1
650 SUPPOSITORY RECTAL EVERY 6 HOURS PRN
Status: CANCELLED | OUTPATIENT
Start: 2025-06-15

## 2025-06-15 RX ORDER — MIDAZOLAM HYDROCHLORIDE 2 MG/2ML
2 INJECTION, SOLUTION INTRAMUSCULAR; INTRAVENOUS
Status: COMPLETED | OUTPATIENT
Start: 2025-06-15 | End: 2025-06-15

## 2025-06-15 RX ORDER — SODIUM CHLORIDE 0.9 % (FLUSH) 0.9 %
5-40 SYRINGE (ML) INJECTION PRN
Status: CANCELLED | OUTPATIENT
Start: 2025-06-15

## 2025-06-15 RX ORDER — CLOPIDOGREL BISULFATE 75 MG/1
75 TABLET ORAL DAILY
Status: DISCONTINUED | OUTPATIENT
Start: 2025-06-15 | End: 2025-06-16 | Stop reason: HOSPADM

## 2025-06-15 RX ORDER — AZELASTINE 1 MG/ML
2 SPRAY, METERED NASAL 2 TIMES DAILY
Status: CANCELLED | OUTPATIENT
Start: 2025-06-15

## 2025-06-15 RX ORDER — METHOCARBAMOL 750 MG/1
750 TABLET, FILM COATED ORAL 2 TIMES DAILY PRN
Status: DISCONTINUED | OUTPATIENT
Start: 2025-06-15 | End: 2025-06-16 | Stop reason: HOSPADM

## 2025-06-15 RX ORDER — OXYCODONE AND ACETAMINOPHEN 5; 325 MG/1; MG/1
1 TABLET ORAL ONCE
Refills: 0 | Status: COMPLETED | OUTPATIENT
Start: 2025-06-15 | End: 2025-06-15

## 2025-06-15 RX ORDER — HYDROXYCHLOROQUINE SULFATE 200 MG/1
200 TABLET, FILM COATED ORAL DAILY
Status: DISCONTINUED | OUTPATIENT
Start: 2025-06-15 | End: 2025-06-16 | Stop reason: HOSPADM

## 2025-06-15 RX ORDER — ASPIRIN 81 MG/1
81 TABLET, CHEWABLE ORAL DAILY
Status: DISCONTINUED | OUTPATIENT
Start: 2025-06-16 | End: 2025-06-16 | Stop reason: HOSPADM

## 2025-06-15 RX ORDER — SODIUM CHLORIDE 0.9 % (FLUSH) 0.9 %
5-40 SYRINGE (ML) INJECTION EVERY 12 HOURS SCHEDULED
Status: CANCELLED | OUTPATIENT
Start: 2025-06-15

## 2025-06-15 RX ORDER — MULTIVIT WITH MINERALS/LUTEIN
1 TABLET ORAL DAILY
Status: CANCELLED | OUTPATIENT
Start: 2025-06-15

## 2025-06-15 RX ORDER — SODIUM CHLORIDE 9 MG/ML
INJECTION, SOLUTION INTRAVENOUS PRN
Status: CANCELLED | OUTPATIENT
Start: 2025-06-15

## 2025-06-15 RX ADMIN — OXYCODONE AND ACETAMINOPHEN 1 TABLET: 5; 325 TABLET ORAL at 10:29

## 2025-06-15 RX ADMIN — ACETAMINOPHEN 650 MG: 325 TABLET ORAL at 18:46

## 2025-06-15 RX ADMIN — METHOCARBAMOL TABLETS 750 MG: 750 TABLET, COATED ORAL at 18:47

## 2025-06-15 RX ADMIN — CLOPIDOGREL BISULFATE 75 MG: 75 TABLET ORAL at 18:47

## 2025-06-15 RX ADMIN — CLOPIDOGREL BISULFATE 300 MG: 75 TABLET, FILM COATED ORAL at 00:22

## 2025-06-15 RX ADMIN — MIDAZOLAM HYDROCHLORIDE 2 MG: 1 INJECTION, SOLUTION INTRAMUSCULAR; INTRAVENOUS at 01:24

## 2025-06-15 RX ADMIN — ASPIRIN 324 MG: 81 TABLET, CHEWABLE ORAL at 00:21

## 2025-06-15 ASSESSMENT — PAIN DESCRIPTION - ORIENTATION
ORIENTATION: LEFT

## 2025-06-15 ASSESSMENT — PAIN SCALES - GENERAL
PAINLEVEL_OUTOF10: 8
PAINLEVEL_OUTOF10: 6
PAINLEVEL_OUTOF10: 5

## 2025-06-15 ASSESSMENT — PAIN DESCRIPTION - LOCATION
LOCATION: BACK;FLANK
LOCATION: BACK;NECK
LOCATION: FLANK;BACK
LOCATION: NECK;HEAD;SHOULDER;BACK

## 2025-06-15 ASSESSMENT — PAIN - FUNCTIONAL ASSESSMENT: PAIN_FUNCTIONAL_ASSESSMENT: 0-10

## 2025-06-15 NOTE — H&P
[facility]   History and Physical      CHIEF COMPLAINT:  Nausea (Starting today. States has been having body aches for the past 2 weeks. ), Heartburn, and Numbness      Reason for Admission:  No admission diagnoses are documented for this encounter.    History Obtained From:  patient, electronic medical record    HISTORY OF PRESENT ILLNESS:      The patient is a 65 y.o. female of Neema Napoles MD  has a past medical history of Ascending aorta enlargement, GERD (gastroesophageal reflux disease), Headache, Hiatal hernia, Osteoporosis, and Plantar fasciitis who presents with occultly with speech/started yesterday    At the ER, temp was 97.6 respiratory rate 16, HR 86, blood pressure 148/79, satting 96% in room air.  She has normal renal and liver function, no anemia no leukocytosis.  Elevated blood glucose at 185    CT head without, CTA head and neck and MRI was obtained.  Initial reads was suspicious for lacunar stroke.  Patient is being transferred to [facility] for full stroke evaluation.    On evaluation, patient was laying in bed.   by the bedside.  Patient stated that the past 2 months she has been having  abnormal sensation of her right face around her right orbit.  She presented to her ophthalmologist.  She was told her eye exam was normal.  She is on Plaquenil.    She mentioned that the TV appeared foggy to her.    All labs personally reviewed   All imaging personally reviewed     Past Medical History:        Diagnosis Date    Ascending aorta enlargement     no current issues- cardiology signed off 2019 (see Epic notes)     GERD (gastroesophageal reflux disease)     Headache     Hiatal hernia     Osteoporosis     Plantar fasciitis      Past Surgical History:        Procedure Laterality Date    COLONOSCOPY  2013    Dr Loza    COLONOSCOPY  2007    Dr. Paige    COLONOSCOPY N/A 7/7/2023    COLORECTAL CANCER SCREENING, NOT HIGH RISK performed by Brianna Newberry MD at [facility]

## 2025-06-15 NOTE — ED NOTES
Bedside swallow eval done and completed. Patient tolerated taking in an entire glass of water via spoon then sipping without any signs or symptoms of choking or coughing.

## 2025-06-15 NOTE — ED PROVIDER NOTES
[facility] EMERGENCY DEPARTMENT  EMERGENCY DEPARTMENT ENCOUNTER        Pt Name: Carey Vail  MRN: [id number]  Birthdate 1960  Date of evaluation: 6/14/2025  Provider: Mike Quiroz MD  PCP: Neema Napoles MD  Note Started: 6:40 PM EDT 6/14/25    CHIEF COMPLAINT       Chief Complaint   Patient presents with    Nausea     Starting today. States has been having body aches for the past 2 weeks.     Heartburn    Numbness       HISTORY OF PRESENT ILLNESS: 1 or more Elements        Limitations to history : None    Carey Vail is a 65 y.o. female who presents to the emergency room for nausea, heartburn, left arm numbness.  States she had an episode today where she got dizzy, confused, and could not speak.  It resolved by the time EMS arrived.  She called 911 for that reason.  She is now back to baseline.  States the tingling and numbness is mostly resolved, and the heartburn is mostly resolved at this point.  She does have a history of hyperlipidemia, does not smoke, does not have hypertension, is not diabetic.    Nursing Notes were all reviewed and agreed with or any disagreements were addressed in the HPI.      REVIEW OF EXTERNAL NOTE :       Office visit 2/28/2025 patient has a history of hyperlipidemia    REVIEW OF SYSTEMS :      Positives and Pertinent negatives as per HPI.     SURGICAL HISTORY     Past Surgical History:   Procedure Laterality Date    COLONOSCOPY  2013    Dr Loza    COLONOSCOPY  2007    Dr. Paige    COLONOSCOPY N/A 7/7/2023    COLORECTAL CANCER SCREENING, NOT HIGH RISK performed by Brianna Newberry MD at [facility] ENDOSCOPY    LAPAROSCOPY  10/1995    MYOMECTOMY  03/1996    SHOULDER SURGERY Right 07/2002    SHOULDER SURGERY Left 09/2002    SINUS ENDOSCOPY N/A 3/29/2021    FUNCTIONAL ENDOSCOPIC SINUS SURGERY SEPTOPLASTY AND SUBMUCOSAL RESECTION OF INFERIOR TURBINOPLASTY performed by Tommie May DO at [facility] OR    TONSILLECTOMY      UPPER 
NOTES AND VITALS REVIEWED ---------------------------   The nursing notes within the ED encounter and vital signs as below have been reviewed by myself and ED attending.  /79   Pulse 86   Temp 97.6 °F (36.4 °C) (Oral)   Resp 20   Ht 1.575 m (5' 2\")   Wt 69.4 kg (153 lb)   LMP 01/22/2016   SpO2 98%   BMI 27.98 kg/m²   Oxygen Saturation Interpretation: Normal    The patient’s available past medical records and past encounters were reviewed by myself and ED attending        ------------------------------ ED COURSE/MEDICAL DECISION MAKING----------------------    Medical Decision Making/Differential Diagnosis:    CC/HPI Summary, Pertinent Physical Exam Findings, Social Determinants of health, Records Reviewed, DDx, testing done/not done, ED Course, Reassessment, disposition considerations/shared decision making with patient, consults, disposition:      Medical Decision Making/ED COURSE:    Chronic Conditions affecting care:    has a past medical history of Ascending aorta enlargement, GERD (gastroesophageal reflux disease), Headache, Hiatal hernia, Osteoporosis, and Plantar fasciitis.       Myself and ED attending interpreted findings during patient's stay.   Vital signs /79   Pulse 86   Temp 97.6 °F (36.4 °C) (Oral)   Resp 20   Ht 1.575 m (5' 2\")   Wt 69.4 kg (153 lb)   LMP 01/22/2016   SpO2 98%   BMI 27.98 kg/m²     Carey Vail is a 65 y.o. female who presents to the emergency department with complaints of nausea, heartburn, and numbness.While in the ED patient was hemodynamically stable, afebrile, nontoxic-appearing, in no respiratory distress. Physical exam remarkable for NIH of 0.  Patient was not having any symptoms at moment of initial evaluation.  CT imaging was concerning for a lacunar stroke.  I spoke with telestroke doctor who recommended patient be given Plavix, and aspirin.  Patient to get MRI as well.  Patient was reevaluated and she had an NIH of 0.  Patient is back to her

## 2025-06-15 NOTE — ED NOTES
No baseline assessment complete upon receiving report from day shift RN. Shift assessment completed at this time

## 2025-06-15 NOTE — ED NOTES
Assumed care of patient. Introduced self to patient as RN. Patient complains of discomfort to her back and neck which are chronic. Awaiting transfer to Main. Call light within reach.

## 2025-06-16 ENCOUNTER — HOSPITAL ENCOUNTER (INPATIENT)
Age: 65
LOS: 1 days | Discharge: HOME | DRG: 069 | End: 2025-06-17
Attending: INTERNAL MEDICINE | Admitting: INTERNAL MEDICINE
Payer: OTHER GOVERNMENT

## 2025-06-16 VITALS
BODY MASS INDEX: 28.16 KG/M2 | DIASTOLIC BLOOD PRESSURE: 81 MMHG | SYSTOLIC BLOOD PRESSURE: 134 MMHG | HEART RATE: 76 BPM | WEIGHT: 153 LBS | TEMPERATURE: 98.7 F | HEIGHT: 62 IN | RESPIRATION RATE: 28 BRPM | OXYGEN SATURATION: 95 %

## 2025-06-16 DIAGNOSIS — E78.2 MIXED HYPERLIPIDEMIA: ICD-10-CM

## 2025-06-16 DIAGNOSIS — G45.9 TIA (TRANSIENT ISCHEMIC ATTACK): Primary | ICD-10-CM

## 2025-06-16 DIAGNOSIS — Z51.89 ENCOUNTER FOR CARDIAC REHABILITATION: ICD-10-CM

## 2025-06-16 LAB
EKG ATRIAL RATE: 82 BPM
EKG P AXIS: 61 DEGREES
EKG P-R INTERVAL: 168 MS
EKG Q-T INTERVAL: 406 MS
EKG QRS DURATION: 96 MS
EKG QTC CALCULATION (BAZETT): 474 MS
EKG R AXIS: 64 DEGREES
EKG T AXIS: 65 DEGREES
EKG VENTRICULAR RATE: 82 BPM
HBA1C MFR BLD: 5.6 % (ref 4–5.6)

## 2025-06-16 PROCEDURE — 6370000000 HC RX 637 (ALT 250 FOR IP)

## 2025-06-16 PROCEDURE — 2500000003 HC RX 250 WO HCPCS: Performed by: NURSE PRACTITIONER

## 2025-06-16 PROCEDURE — 36415 COLL VENOUS BLD VENIPUNCTURE: CPT

## 2025-06-16 PROCEDURE — 97165 OT EVAL LOW COMPLEX 30 MIN: CPT

## 2025-06-16 PROCEDURE — 6370000000 HC RX 637 (ALT 250 FOR IP): Performed by: NURSE PRACTITIONER

## 2025-06-16 PROCEDURE — 2060000000 HC ICU INTERMEDIATE R&B

## 2025-06-16 PROCEDURE — 97161 PT EVAL LOW COMPLEX 20 MIN: CPT

## 2025-06-16 PROCEDURE — 93010 ELECTROCARDIOGRAM REPORT: CPT | Performed by: INTERNAL MEDICINE

## 2025-06-16 PROCEDURE — 92523 SPEECH SOUND LANG COMPREHEN: CPT

## 2025-06-16 PROCEDURE — 92610 EVALUATE SWALLOWING FUNCTION: CPT

## 2025-06-16 PROCEDURE — 83036 HEMOGLOBIN GLYCOSYLATED A1C: CPT

## 2025-06-16 RX ORDER — ACETAMINOPHEN 650 MG/1
650 SUPPOSITORY RECTAL EVERY 6 HOURS PRN
Status: DISCONTINUED | OUTPATIENT
Start: 2025-06-16 | End: 2025-06-17 | Stop reason: HOSPADM

## 2025-06-16 RX ORDER — AZELASTINE 1 MG/ML
2 SPRAY, METERED NASAL 2 TIMES DAILY
Status: DISCONTINUED | OUTPATIENT
Start: 2025-06-16 | End: 2025-06-16 | Stop reason: CLARIF

## 2025-06-16 RX ORDER — PROCHLORPERAZINE EDISYLATE 5 MG/ML
5 INJECTION INTRAMUSCULAR; INTRAVENOUS EVERY 6 HOURS PRN
Status: DISCONTINUED | OUTPATIENT
Start: 2025-06-16 | End: 2025-06-17 | Stop reason: HOSPADM

## 2025-06-16 RX ORDER — CALCIUM CARBONATE 500 MG/1
500 TABLET, CHEWABLE ORAL 3 TIMES DAILY PRN
Status: DISCONTINUED | OUTPATIENT
Start: 2025-06-16 | End: 2025-06-17 | Stop reason: HOSPADM

## 2025-06-16 RX ORDER — SODIUM CHLORIDE 0.9 % (FLUSH) 0.9 %
5-40 SYRINGE (ML) INJECTION EVERY 12 HOURS SCHEDULED
Status: DISCONTINUED | OUTPATIENT
Start: 2025-06-16 | End: 2025-06-17 | Stop reason: HOSPADM

## 2025-06-16 RX ORDER — BISACODYL 10 MG
10 SUPPOSITORY, RECTAL RECTAL DAILY PRN
Status: DISCONTINUED | OUTPATIENT
Start: 2025-06-16 | End: 2025-06-17 | Stop reason: HOSPADM

## 2025-06-16 RX ORDER — METHOCARBAMOL 500 MG/1
750 TABLET, FILM COATED ORAL 2 TIMES DAILY PRN
Status: DISCONTINUED | OUTPATIENT
Start: 2025-06-16 | End: 2025-06-17 | Stop reason: HOSPADM

## 2025-06-16 RX ORDER — SODIUM CHLORIDE 9 MG/ML
INJECTION, SOLUTION INTRAVENOUS PRN
Status: DISCONTINUED | OUTPATIENT
Start: 2025-06-16 | End: 2025-06-17 | Stop reason: HOSPADM

## 2025-06-16 RX ORDER — SODIUM CHLORIDE 0.9 % (FLUSH) 0.9 %
5-40 SYRINGE (ML) INJECTION PRN
Status: DISCONTINUED | OUTPATIENT
Start: 2025-06-16 | End: 2025-06-17 | Stop reason: HOSPADM

## 2025-06-16 RX ORDER — ACETAMINOPHEN 325 MG/1
650 TABLET ORAL EVERY 6 HOURS PRN
Status: DISCONTINUED | OUTPATIENT
Start: 2025-06-16 | End: 2025-06-17 | Stop reason: HOSPADM

## 2025-06-16 RX ORDER — MAGNESIUM SULFATE IN WATER 40 MG/ML
2000 INJECTION, SOLUTION INTRAVENOUS PRN
Status: DISCONTINUED | OUTPATIENT
Start: 2025-06-16 | End: 2025-06-17 | Stop reason: HOSPADM

## 2025-06-16 RX ORDER — M-VIT,TX,IRON,MINS/CALC/FOLIC 27MG-0.4MG
1 TABLET ORAL DAILY
Status: DISCONTINUED | OUTPATIENT
Start: 2025-06-16 | End: 2025-06-17 | Stop reason: HOSPADM

## 2025-06-16 RX ADMIN — METHOCARBAMOL 750 MG: 500 TABLET ORAL at 21:26

## 2025-06-16 RX ADMIN — PSYLLIUM HUSK 1 PACKET: 3.4 POWDER ORAL at 19:32

## 2025-06-16 RX ADMIN — SODIUM CHLORIDE, PRESERVATIVE FREE 10 ML: 5 INJECTION INTRAVENOUS at 11:47

## 2025-06-16 RX ADMIN — Medication 1 TABLET: at 21:26

## 2025-06-16 RX ADMIN — CALCIUM CARBONATE 500 MG: 500 TABLET, CHEWABLE ORAL at 21:29

## 2025-06-16 RX ADMIN — SODIUM CHLORIDE, PRESERVATIVE FREE 10 ML: 5 INJECTION INTRAVENOUS at 19:33

## 2025-06-16 ASSESSMENT — PAIN DESCRIPTION - LOCATION: LOCATION: BACK

## 2025-06-16 ASSESSMENT — PAIN - FUNCTIONAL ASSESSMENT
PAIN_FUNCTIONAL_ASSESSMENT: ACTIVITIES ARE NOT PREVENTED
PAIN_FUNCTIONAL_ASSESSMENT: NONE - DENIES PAIN

## 2025-06-16 ASSESSMENT — PAIN SCALES - GENERAL
PAINLEVEL_OUTOF10: 2
PAINLEVEL_OUTOF10: 4
PAINLEVEL_OUTOF10: 0

## 2025-06-16 ASSESSMENT — PAIN DESCRIPTION - DESCRIPTORS: DESCRIPTORS: THROBBING;TIGHTNESS;STABBING

## 2025-06-16 NOTE — PROGRESS NOTES
4 Eyes Skin Assessment     NAME:  Carey Vail  YOB: 1960  MEDICAL RECORD NUMBER:  [id number]    The patient is being assessed for  Admission    I agree that at least one RN has performed a thorough Head to Toe Skin Assessment on the patient. ALL assessment sites listed below have been assessed.      Areas assessed by both nurses:    Head, Face, Ears, Shoulders, Back, Chest, Arms, Elbows, Hands, Sacrum. Buttock, Coccyx, Ischium, Legs. Feet and Heels, and Under Medical Devices         Does the Patient have a Wound? No noted wound(s)       Donnie Prevention initiated by RN: No  Wound Care Orders initiated by RN: No    Pressure Injury (Stage 3,4, Unstageable, DTI, NWPT, and Complex wounds) if present, place Wound referral order by RN under : No    New Ostomies, if present place, Ostomy referral order under : No     Nurse 1 eSignature: Electronically signed by Donald Ceballos RN on 6/16/25 at 12:15 PM EDT    **SHARE this note so that the co-signing nurse can place an eSignature**    Nurse 2 eSignature: Electronically signed by Margie Lu RN on 6/16/25 at 12:17 PM EDT

## 2025-06-16 NOTE — ED NOTES
Nurse to nurse report given to FELY Gomez at [facility] on [address].

## 2025-06-16 NOTE — ED NOTES
Patients blood pressure cuff had fallen down making a false reading  Reapplied cuff properly and pressure returned to normal range.

## 2025-06-16 NOTE — PROGRESS NOTES
Notified Dr. López, pt arrived from [facility].    Message resent to Vera Braun NP.

## 2025-06-16 NOTE — PROGRESS NOTES
Spoke with Kelly Snow regarding aspirin and plavix, patient questioning if she needs to be on them. Patient also requesting a muscle relaxer for her back and something for acid reflux. Notified Kelly that the provider did not see patient today and still awaiting neuro to see.

## 2025-06-16 NOTE — PROGRESS NOTES
SPEECH/LANGUAGE PATHOLOGY  SPEECH/LANGUAGE/COGNITIVE EVALUATION   and PLAN OF CARE      PATIENT NAME:  Carey Vail  (female)     MRN:  [id number]    :  1960  (65 y.o.)  STATUS:  Inpatient: Room 8509/8509-B    TODAY'S DATE:  2025  ORDER DATE, DESCRIPTION AND REFERRING PROVIDER :    SLP eval and treat  Start:  25,   End:  25,   ONE TIME,   Standing Count:  1 Occurrences,   R       Alberto, April, APRN - CNP   REASON FOR REFERRAL:  Speech and Swallow Evaluation  EVALUATING THERAPIST: RUBIN Borden    ADMITTING DIAGNOSIS: Acute CVA (cerebrovascular accident) (HCC) [I63.9]    VISIT DIAGNOSIS:        SPEECH THERAPY  PLAN OF CARE   The speech therapy  POC is established based on physician order, speech pathology diagnosis and results of clinical assessment     SPEECH PATHOLOGY DIAGNOSIS:    Patient presents with cognitive linguistic skills at a functional baseline. She independently verbally expresses her basic wants and needs, follows multi-step directions, and is oriented x4.     Speech Pathology intervention is not warranted at this time.     Conditions Requiring Skilled Therapeutic Intervention for speech, language and/or cognition    Not applicable     Specific Speech Therapy Interventions to Include:   Not applicable    Specific instructions for next treatment:     not applicable    SHORT/LONG TERM GOALS  Not applicable.   Therapy is not recommended    Patient goals: Patient/family involved in developing goals and treatment plan:   Treatment goals discussed with Patient and Family    The Patient and Family understand(s) the diagnosis, prognosis and plan of care   The patient/family Agreed with above,     This plan may be re-evaluated and revised as warranted.        Rehabilitation Potential/Prognosis: Good               CLINICAL ASSESSMENT:  MOTOR SPEECH       Oral Peripheral Examination   Adequate lingual/labial strength     Parameters of Speech

## 2025-06-16 NOTE — PROGRESS NOTES
SPEECH/LANGUAGE PATHOLOGY  CLINICAL ASSESSMENT OF SWALLOWING FUNCTION   and PLAN OF CARE      PATIENT NAME:  Carey Vail  (female)     MRN:  [id number]    :  1960  (65 y.o.)  STATUS:  Inpatient: Room 8509/8509-B    TODAY'S DATE:  2025  ORDER DATE, DESCRIPTION AND REFERRING PROVIDER:    SLP eval and treat  Start:  25,   End:  25,   ONE TIME,   Standing Count:  1 Occurrences,   R       Alberto, April, APRN - CNP  REASON FOR REFERRAL: Speech and Swallow Evaluation   EVALUATING THERAPIST: RUBIN Borden                 RESULTS:    DYSPHAGIA DIAGNOSIS:   functional oropharyngeal swallow for age/premorbid functioning      DIET RECOMMENDATIONS:  Regular consistency solids (IDDSI level 7) with  thin liquids (IDDSI level 0)     FEEDING RECOMMENDATIONS:     Assistance level:  No assistance needed      Compensatory strategies recommended: Thorough, frequent oral care to prevent colonization of oral bacteria, Fully alert for all PO, Thorough oral care to prevent colonization of oral bacteria, Upright in bed/ chair as tolerated, Effortful swallow      Discussed recommendations with:  Patient  and patient nurse in person    SPEECH THERAPY  PLAN OF CARE   The dysphagia POC is established based on physician order, dysphagia diagnosis and results of clinical assessment     Dysphagia therapy is not recommended following today's session due to independent with implementation of strategies/modifications.     Conditions Requiring Skilled Therapeutic Intervention for dysphagia:    Not applicable    Specific dysphagia interventions to include:     Not applicable no therapy warranted     Specific instructions for next treatment:  not applicable   Patient Treatment Goals:    Short Term Goals:  Not applicable no therapy warranted     Long Term Goals:   Not applicable no therapy warranted      Patient/family Goal:    To get stronger    Plan of care discussed with Patient and Family   The

## 2025-06-16 NOTE — PROGRESS NOTES
OCCUPATIONAL THERAPY INITIAL EVALUATION    [facility]   [address], [place]       Date:2025                                                  Patient Name: Carey Vail  MRN: [id number]  : 1960  Room: [facility]8509    Evaluating OT: Kennedi Rizo, OTD, OTR/L; [id number]    Occupational therapy physician order:  Start   Ordering Provider    25  OT eval and treat  Start:  25,   End:  25,   ONE TIME,   Standing Count:  1 Occurrences,   R         [place], N - CNP         Pt presents to ED with nausea, heartburn, and LUE numbness    Diagnosis:  Acute CVA (cerebrovascular accident) (HCC) [I63.9]      Pertinent Medical History:  has a past medical history of Ascending aorta enlargement, GERD (gastroesophageal reflux disease), Headache, Hiatal hernia, Osteoporosis, and Plantar fasciitis.   Past Surgical History:   Procedure Laterality Date    COLONOSCOPY      Dr Loza    COLONOSCOPY      Dr. Paige    COLONOSCOPY N/A 2023    COLORECTAL CANCER SCREENING, NOT HIGH RISK performed by Brianna Newberry MD at [facility] ENDOSCOPY    LAPAROSCOPY  10/1995    MYOMECTOMY  1996    SHOULDER SURGERY Right 2002    SHOULDER SURGERY Left 2002    SINUS ENDOSCOPY N/A 3/29/2021    FUNCTIONAL ENDOSCOPIC SINUS SURGERY SEPTOPLASTY AND SUBMUCOSAL RESECTION OF INFERIOR TURBINOPLASTY performed by Tommie May DO at [facility] OR    TONSILLECTOMY      UPPER GASTROINTESTINAL ENDOSCOPY N/A 2018    EGD BIOPSY performed by Josh Garcia MD at [facility] ENDOSCOPY       Surgery/Procedures: none this admission     Recommended adaptive equipment: TBD     Precautions:  none    Assessment of current deficits   [] Functional mobility   []ADLs  [] Strength               []Cognition   [] Functional transfers   [] IADLs         [] Safety Awareness   []Endurance   [] Fine Coordination              []  Balance      [] Vision/perception   []Sensation    []Gross Motor Coordination  [] ROM  [] Delirium                   [] Motor Control     OT PLAN OF CARE   OT POC based on physician orders, patient diagnosis and results of clinical assessment    Frequency/Duration : NA  Specific OT Treatment to include: NA    Modified Alise Scale (MRS)  Score     Description  0             No symptoms  1             No significant disability despite symptoms  2             Slight disability; able to look after own affairs  3             Moderate disability; able to ambulate without assist/ requires assist with ADLs  4             Moderate/Severe disability;requires assist to ambulate/assist with ADLs  5             Severe disability;bedridden/incontinent   6               Score:   0    Home Living: Pt lives with spouse  in a 1 story house with 1 step to enter  and no hand rails    Bedroom setup: main level  standard bed   Bathroom setup: main level  walk in shower , tub/shower combination , and standard commode     Equipment owned: none    Prior Level of Function:  Independent with ADLs , Independent with IADLs; using no AD for functional mobility.    Driving: active  Occupation/leisure: did not state    Pain Level: no pain     Cognition: A&O: 4/4   Follows multi  step directions: Good   Memory: Good   Sequencing: Good   Problem solving: Good   Judgement/safety: Good   Attention:  Good     Functional Assessment:  AM-PAC Daily Activity Raw Score: 24/24   Initial Eval Status  Date: 2025   Feeding Independent    Grooming Independent    UB Dressing Independent    LB Dressing Independent    Bathing Independent   Toileting Independent    Bed Mobility  Supine to sit: Independent   Sit to supine: Independent    Functional Transfers Sit to stand: Independent   Stand to sit: Independent   Stand pivot: Independent    Functional Mobility Indep with no AD   Balance Sitting:     Static:  wfl    Dynamic:wfl  Standing: wfl   Activity

## 2025-06-16 NOTE — PROGRESS NOTES
Physical Therapy  Initial Assessment     Name: Carey Vail  : 1960  MRN: [id number]      Date of Service: 2025    Evaluating PT: Jennifer Posada PT, DPT [id number]      Room #:  8509/8509-B  Diagnosis:  Acute CVA (cerebrovascular accident) ([facility]) [I63.9]  PMHx/PSHx:    Past Medical History:   Diagnosis Date    Ascending aorta enlargement     no current issues- cardiology signed off 2019 (see Epic notes)     GERD (gastroesophageal reflux disease)     Headache     Hiatal hernia     Osteoporosis     Plantar fasciitis       Procedure/Surgery:  None this admission  Precautions:  None   Equipment Needs:  None    SUBJECTIVE:    Pt lives with  in a ranch style home with 1 stair(s) and no rail(s) to enter. Pt ambulated with no AD prior to admission.    OBJECTIVE:   Initial Evaluation  Date: 25   AM-PAC 6 Clicks    Was pt agreeable to Eval/treatment? Yes    Does pt have pain? Not reported   Bed Mobility  Rolling: Independent  Supine to sit: Independent  Sit to supine: Independent  Scooting: Independent   Transfers Sit to stand: Independent   Stand to sit: Independent   Stand pivot: Independent    Ambulation   200 feet with no AD Independent   Stair negotiation: ascended and descended 4 step(s) with 1 rail(s) Modified Independent   ROM BUE: WFL  BLE: WFL   Strength BUE: WFL  BLE: 5/5    Balance Sitting EOB: Independent   Dynamic Standing: Independent      Pt is A & O x: 4 to person, place, month/year, and situation.  Sensation: denies numbness and tingling   Edema: unremarkable     Patient education  Pt educated on PT role and safety with all mobility.     Patient response to education:   Pt verbalized understanding Pt demonstrated skill Pt requires further education in this area   Yes  yes no     ASSESSMENT:    Comments:    Pt was supine in bed upon room entry, agreeable to PT evaluation. Pt completed all mobility independently. Pt reports they are at functional baseline and that there are no

## 2025-06-17 ENCOUNTER — APPOINTMENT (OUTPATIENT)
Age: 65
DRG: 069 | End: 2025-06-17
Attending: PSYCHIATRY & NEUROLOGY
Payer: OTHER GOVERNMENT

## 2025-06-17 VITALS
SYSTOLIC BLOOD PRESSURE: 140 MMHG | OXYGEN SATURATION: 94 % | DIASTOLIC BLOOD PRESSURE: 97 MMHG | WEIGHT: 153 LBS | HEIGHT: 62 IN | HEART RATE: 80 BPM | TEMPERATURE: 98.7 F | RESPIRATION RATE: 19 BRPM | BODY MASS INDEX: 28.16 KG/M2

## 2025-06-17 LAB
ANION GAP SERPL CALCULATED.3IONS-SCNC: 12 MMOL/L (ref 7–16)
BASOPHILS # BLD: 0.03 K/UL (ref 0–0.2)
BASOPHILS NFR BLD: 1 % (ref 0–2)
BUN SERPL-MCNC: 11 MG/DL (ref 8–23)
CALCIUM SERPL-MCNC: 8.9 MG/DL (ref 8.8–10.2)
CHLORIDE SERPL-SCNC: 104 MMOL/L (ref 98–107)
CO2 SERPL-SCNC: 24 MMOL/L (ref 22–29)
CREAT SERPL-MCNC: 0.7 MG/DL (ref 0.5–1)
ECHO AO ASC DIAM: 3.5 CM
ECHO AO ASCENDING AORTA INDEX: 2.05 CM/M2
ECHO AV AREA PEAK VELOCITY: 2.6 CM2
ECHO AV AREA VTI: 2.5 CM2
ECHO AV AREA/BSA PEAK VELOCITY: 1.5 CM2/M2
ECHO AV AREA/BSA VTI: 1.5 CM2/M2
ECHO AV CUSP MM: 1.3 CM
ECHO AV MEAN GRADIENT: 4 MMHG
ECHO AV MEAN VELOCITY: 0.9 M/S
ECHO AV PEAK GRADIENT: 7 MMHG
ECHO AV PEAK VELOCITY: 1.3 M/S
ECHO AV VELOCITY RATIO: 0.92
ECHO AV VTI: 25.7 CM
ECHO BSA: 1.74 M2
ECHO EST RA PRESSURE: 3 MMHG
ECHO LA DIAMETER INDEX: 1.93 CM/M2
ECHO LA DIAMETER: 3.3 CM
ECHO LA VOL A-L A2C: 43 ML (ref 22–52)
ECHO LA VOL A-L A4C: 28 ML (ref 22–52)
ECHO LA VOL MOD A2C: 43 ML (ref 22–52)
ECHO LA VOL MOD A4C: 26 ML (ref 22–52)
ECHO LA VOLUME AREA LENGTH: 36 ML
ECHO LA VOLUME INDEX A-L A2C: 25 ML/M2 (ref 16–34)
ECHO LA VOLUME INDEX A-L A4C: 16 ML/M2 (ref 16–34)
ECHO LA VOLUME INDEX AREA LENGTH: 21 ML/M2 (ref 16–34)
ECHO LA VOLUME INDEX MOD A2C: 25 ML/M2 (ref 16–34)
ECHO LA VOLUME INDEX MOD A4C: 15 ML/M2 (ref 16–34)
ECHO LV E' LATERAL VELOCITY: 10 CM/S
ECHO LV E' SEPTAL VELOCITY: 6 CM/S
ECHO LV EF PHYSICIAN: 60 %
ECHO LV FRACTIONAL SHORTENING: 29 % (ref 28–44)
ECHO LV INTERNAL DIMENSION DIASTOLE INDEX: 2.63 CM/M2
ECHO LV INTERNAL DIMENSION DIASTOLIC: 4.5 CM (ref 3.9–5.3)
ECHO LV INTERNAL DIMENSION SYSTOLIC INDEX: 1.87 CM/M2
ECHO LV INTERNAL DIMENSION SYSTOLIC: 3.2 CM
ECHO LV ISOVOLUMETRIC RELAXATION TIME (IVRT): 106.1 MS
ECHO LV IVSD: 0.8 CM (ref 0.6–0.9)
ECHO LV IVSS: 1.2 CM
ECHO LV MASS 2D: 104.5 G (ref 67–162)
ECHO LV MASS INDEX 2D: 61.1 G/M2 (ref 43–95)
ECHO LV POSTERIOR WALL DIASTOLIC: 0.7 CM (ref 0.6–0.9)
ECHO LV POSTERIOR WALL SYSTOLIC: 1.1 CM
ECHO LV RELATIVE WALL THICKNESS RATIO: 0.31
ECHO LVOT AREA: 2.8 CM2
ECHO LVOT AV VTI INDEX: 0.88
ECHO LVOT DIAM: 1.9 CM
ECHO LVOT MEAN GRADIENT: 3 MMHG
ECHO LVOT PEAK GRADIENT: 5 MMHG
ECHO LVOT PEAK VELOCITY: 1.2 M/S
ECHO LVOT STROKE VOLUME INDEX: 37.5 ML/M2
ECHO LVOT SV: 64 ML
ECHO LVOT VTI: 22.6 CM
ECHO MV "A" WAVE DURATION: 138.4 MSEC
ECHO MV A VELOCITY: 0.57 M/S
ECHO MV AREA PHT: 2.4 CM2
ECHO MV AREA VTI: 3.7 CM2
ECHO MV E DECELERATION TIME (DT): 284.7 MS
ECHO MV E VELOCITY: 0.53 M/S
ECHO MV E/A RATIO: 0.93
ECHO MV E/E' LATERAL: 5.3
ECHO MV E/E' RATIO (AVERAGED): 7.07
ECHO MV E/E' SEPTAL: 8.83
ECHO MV LVOT VTI INDEX: 0.77
ECHO MV MAX VELOCITY: 0.8 M/S
ECHO MV MEAN GRADIENT: 1 MMHG
ECHO MV MEAN VELOCITY: 0.4 M/S
ECHO MV PEAK GRADIENT: 2 MMHG
ECHO MV PRESSURE HALF TIME (PHT): 90.6 MS
ECHO MV VTI: 17.5 CM
ECHO PULMONARY ARTERY END DIASTOLIC PRESSURE: 3 MMHG
ECHO PV MAX VELOCITY: 0.8 M/S
ECHO PV MEAN GRADIENT: 2 MMHG
ECHO PV MEAN VELOCITY: 0.7 M/S
ECHO PV PEAK GRADIENT: 3 MMHG
ECHO PV REGURGITANT MAX VELOCITY: 0.9 M/S
ECHO PV VTI: 18.8 CM
ECHO RIGHT VENTRICULAR SYSTOLIC PRESSURE (RVSP): 21 MMHG
ECHO RV BASAL DIMENSION: 3.1 CM
ECHO RV INTERNAL DIMENSION: 3.6 CM
ECHO RV LONGITUDINAL DIMENSION: 6.4 CM
ECHO RV MID DIMENSION: 3.1 CM
ECHO RV TAPSE: 1.9 CM (ref 1.7–?)
ECHO TV REGURGITANT MAX VELOCITY: 2.12 M/S
ECHO TV REGURGITANT PEAK GRADIENT: 18 MMHG
EOSINOPHIL # BLD: 0.08 K/UL (ref 0.05–0.5)
EOSINOPHILS RELATIVE PERCENT: 2 % (ref 0–6)
ERYTHROCYTE [DISTWIDTH] IN BLOOD BY AUTOMATED COUNT: 12.1 % (ref 11.5–15)
GFR, ESTIMATED: >90 ML/MIN/1.73M2
GLUCOSE SERPL-MCNC: 100 MG/DL (ref 74–99)
HCT VFR BLD AUTO: 40.2 % (ref 34–48)
HGB BLD-MCNC: 13.8 G/DL (ref 11.5–15.5)
IMM GRANULOCYTES # BLD AUTO: <0.03 K/UL (ref 0–0.58)
IMM GRANULOCYTES NFR BLD: 0 % (ref 0–5)
LYMPHOCYTES NFR BLD: 2.5 K/UL (ref 1.5–4)
LYMPHOCYTES RELATIVE PERCENT: 61 % (ref 20–42)
MCH RBC QN AUTO: 31.3 PG (ref 26–35)
MCHC RBC AUTO-ENTMCNC: 34.3 G/DL (ref 32–34.5)
MCV RBC AUTO: 91.2 FL (ref 80–99.9)
MONOCYTES NFR BLD: 0.41 K/UL (ref 0.1–0.95)
MONOCYTES NFR BLD: 10 % (ref 2–12)
NEUTROPHILS NFR BLD: 26 % (ref 43–80)
NEUTS SEG NFR BLD: 1.05 K/UL (ref 1.8–7.3)
PLATELET # BLD AUTO: 232 K/UL (ref 130–450)
PMV BLD AUTO: 10.1 FL (ref 7–12)
POTASSIUM SERPL-SCNC: 3.9 MMOL/L (ref 3.5–5.1)
RBC # BLD AUTO: 4.41 M/UL (ref 3.5–5.5)
SODIUM SERPL-SCNC: 139 MMOL/L (ref 136–145)
WBC OTHER # BLD: 4.1 K/UL (ref 4.5–11.5)

## 2025-06-17 PROCEDURE — 80048 BASIC METABOLIC PNL TOTAL CA: CPT

## 2025-06-17 PROCEDURE — 93306 TTE W/DOPPLER COMPLETE: CPT | Performed by: INTERNAL MEDICINE

## 2025-06-17 PROCEDURE — 36415 COLL VENOUS BLD VENIPUNCTURE: CPT

## 2025-06-17 PROCEDURE — 99232 SBSQ HOSP IP/OBS MODERATE 35: CPT

## 2025-06-17 PROCEDURE — 2500000003 HC RX 250 WO HCPCS: Performed by: NURSE PRACTITIONER

## 2025-06-17 PROCEDURE — 93306 TTE W/DOPPLER COMPLETE: CPT

## 2025-06-17 PROCEDURE — 6370000000 HC RX 637 (ALT 250 FOR IP)

## 2025-06-17 PROCEDURE — 85025 COMPLETE CBC W/AUTO DIFF WBC: CPT

## 2025-06-17 RX ORDER — ASPIRIN 81 MG/1
81 TABLET, CHEWABLE ORAL DAILY
Status: DISCONTINUED | OUTPATIENT
Start: 2025-06-17 | End: 2025-06-17 | Stop reason: HOSPADM

## 2025-06-17 RX ORDER — ASPIRIN 81 MG/1
81 TABLET, CHEWABLE ORAL DAILY
Qty: 30 TABLET | Refills: 3 | Status: SHIPPED | OUTPATIENT
Start: 2025-06-18

## 2025-06-17 RX ORDER — ATORVASTATIN CALCIUM 40 MG/1
40 TABLET, FILM COATED ORAL NIGHTLY
Status: DISCONTINUED | OUTPATIENT
Start: 2025-06-17 | End: 2025-06-17 | Stop reason: HOSPADM

## 2025-06-17 RX ORDER — METHOCARBAMOL 750 MG/1
750 TABLET, FILM COATED ORAL 2 TIMES DAILY PRN
Qty: 6 TABLET | Refills: 0 | Status: SHIPPED | OUTPATIENT
Start: 2025-06-17 | End: 2025-06-27

## 2025-06-17 RX ORDER — ATORVASTATIN CALCIUM 40 MG/1
40 TABLET, FILM COATED ORAL NIGHTLY
Qty: 30 TABLET | Refills: 0 | Status: SHIPPED | OUTPATIENT
Start: 2025-06-17 | End: 2025-06-19 | Stop reason: SDUPTHER

## 2025-06-17 RX ORDER — CLOPIDOGREL BISULFATE 75 MG/1
75 TABLET ORAL DAILY
Qty: 30 TABLET | Refills: 3 | Status: SHIPPED | OUTPATIENT
Start: 2025-06-18 | End: 2025-06-19 | Stop reason: SDUPTHER

## 2025-06-17 RX ORDER — CLOPIDOGREL BISULFATE 75 MG/1
75 TABLET ORAL DAILY
Status: DISCONTINUED | OUTPATIENT
Start: 2025-06-17 | End: 2025-06-17 | Stop reason: HOSPADM

## 2025-06-17 RX ADMIN — ASPIRIN 81 MG CHEWABLE TABLET 81 MG: 81 TABLET CHEWABLE at 09:02

## 2025-06-17 RX ADMIN — SODIUM CHLORIDE, PRESERVATIVE FREE 10 ML: 5 INJECTION INTRAVENOUS at 09:03

## 2025-06-17 RX ADMIN — CLOPIDOGREL BISULFATE 75 MG: 75 TABLET, FILM COATED ORAL at 09:02

## 2025-06-17 ASSESSMENT — PAIN SCALES - GENERAL
PAINLEVEL_OUTOF10: 0
PAINLEVEL_OUTOF10: 0

## 2025-06-17 NOTE — H&P
findings. 5. Evidence of subtle chronic microvascular ischemic/aging changes in the periventricular white matter.     CTA HEAD W CONTRAST  Result Date: 6/14/2025  1. No evidence of arterial injury or stenosis in the head or neck. 2. No evidence of intracranial hemorrhage. 3. On the noncontrast CT scan of head obtained on 06/14/2025, at 7:49 p.m., focal asymmetric low attenuation in the area of the posterior portion of posterior limb of the left internal capsule, suspicious for focal lacunar infarction. Follow-up evaluation with MRI of brain suggested.     CTA NECK W CONTRAST  Result Date: 6/14/2025  1. No evidence of arterial injury or stenosis in the head or neck. 2. No evidence of intracranial hemorrhage. 3. On the noncontrast CT scan of head obtained on 06/14/2025, at 7:49 p.m., focal asymmetric low attenuation in the area of the posterior portion of posterior limb of the left internal capsule, suspicious for focal lacunar infarction. Follow-up evaluation with MRI of brain suggested.     CT HEAD WO CONTRAST  Result Date: 6/14/2025  No acute intracranial abnormality.     XR CHEST PORTABLE  Result Date: 6/14/2025  No acute process.         ASSESSMENT:      65 y.o. patient of Neema Napoles MD admitted with     Principal Problem:    Acute CVA (cerebrovascular accident) (HCC)  Resolved Problems:    * No resolved hospital problems. *    65-year-old female presented to [facility] ED with strokelike symptoms was transferred to [facility] and admitted to telemetry unit with CVA    PLAN:  - She is neurologically intact.  No focal deficit  -Patient is transferred to [facility] for stroke workup  -Neurology consult  - Will need ophthalmology consult  -Swallow eval before diet  -She will need echocardiogram, carotid ultrasound  -At the ER, patient was started on Plavix and aspirin per recommendation from telestroke neurologist   -She will need follow-up with

## 2025-06-17 NOTE — PROGRESS NOTES
CLINICAL PHARMACY NOTE: MEDS TO BEDS    Total # of Prescriptions Filled: 3   The following medications were delivered to the patient:  Methocarbamol 750 mg  Clopidogrel bisulfate 75 mg  Aspirin low dose 81 mg chewable  Atorvastatin calcium 40 mg    Additional Documentation:   Delivered to pt

## 2025-06-17 NOTE — PROGRESS NOTES
Carey Vail is a 65 y.o. right handed female     Neurology following for TIA    PMH of GERD, headaches, osteoporosis    Assessment:     TIA  Patient with aphasia, difficulty walking, and left arm paresthesia lasting approximately 10 minutes  MRI negative for acute infarct    A1c 5.6    Plan:     Start aspirin Plavix x 21 days then aspirin monotherapy  Start statin with an LDL goal <70, currently 140  Echocardiogram  Stroke education  Neurology will sign off call if needed  Follow-up with neurology after discharge    History of Present Illness:     Patient initially presented to [facility] on 6/14/2025 after experiencing stroke symptoms at home.  Patient said she was sitting down and felt like she was having a panic attack.  She developed nausea, began to sweat and then began experiencing aphasia and difficulty walking.  Her  called 911 and by the time they arrived she was back to baseline.  She was taken to the ER for further evaluation.    CT head was negative and her CTAs were suggestive for  Low-attenuation in the left internal capsule suspicious for a lacunar infarct.    MRI brain negative for abnormalities    She was transferred to [facility] for neurology evaluation    Subjective:     Patient sitting up in bed awake and alert.  She has had no further strokelike symptoms since her admission.    TIA/stroke education given, all questions answered    There is no family at the bedside    Objective:       BP (!) 123/97   Pulse 84   Temp 97.2 °F (36.2 °C)   Resp 19   Ht 1.575 m (5' 2\")   Wt 69.4 kg (153 lb)   LMP 01/22/2016   SpO2 94%   BMI 27.98 kg/m²       General appearance: alert, appears stated age, cooperative and no distress  Head: normocephalic, without obvious abnormality, atraumatic  Eyes: conjunctivae/corneas clear  Neck: no adenopathy,  supple, symmetrical, trachea midline and thyroid not enlarged, symmetric, no tenderness/mass/nodules  Lungs: Regular

## 2025-06-17 NOTE — CONSULTS
NEUROLOGY CONSULT NOTE      Requesting Physician: ***    Reason for Consult:  Evaluate for NIHSS of 1, possible acute focal lacunar infarct.    History of Present Illness:  Carey Vail is a 65 y.o. female  with h/o *** who was admitted to [facility] on 2025 with presentation of Nausea, heartburn, and left arm numbness.    ***  NIHSS: ***  BP: ***  CT Head: ***  CTA H/N: ***  MRI: ***  Tele Stroke Consult: ***      Past Medical History:        Diagnosis Date   • Ascending aorta enlargement     no current issues- cardiology signed off 2019 (see Epic notes)    • GERD (gastroesophageal reflux disease)    • Headache    • Hiatal hernia    • Osteoporosis    • Plantar fasciitis            Procedure Laterality Date   • COLONOSCOPY      Dr Loza   • COLONOSCOPY      Dr. Paige   • COLONOSCOPY N/A 2023    COLORECTAL CANCER SCREENING, NOT HIGH RISK performed by Brianna Newberry MD at [facility] ENDOSCOPY   • LAPAROSCOPY  10/1995   • MYOMECTOMY  1996   • SHOULDER SURGERY Right 2002   • SHOULDER SURGERY Left 2002   • SINUS ENDOSCOPY N/A 3/29/2021    FUNCTIONAL ENDOSCOPIC SINUS SURGERY SEPTOPLASTY AND SUBMUCOSAL RESECTION OF INFERIOR TURBINOPLASTY performed by Tommie May DO at [facility] OR   • TONSILLECTOMY     • UPPER GASTROINTESTINAL ENDOSCOPY N/A 2018    EGD BIOPSY performed by Josh Garcia MD at [facility] ENDOSCOPY       Social History:  Social History     Tobacco Use   Smoking Status Former   • Current packs/day: 0.00   • Average packs/day: 1 pack/day for 30.0 years (30.0 ttl pk-yrs)   • Types: Cigarettes   • Start date: 10/10/1977   • Quit date: 10/10/2007   • Years since quittin.6   Smokeless Tobacco Never     Social History     Substance and Sexual Activity   Alcohol Use Yes    Comment: 2 or 3 beer or wine weekly     Social History     Substance and Sexual Activity   Drug Use No         Family History:       Problem Relation Age of Onset   • Diabetes Mother   At request of the bedside nurse an order for Tylenol 650 mg po q 4 h prn was placed.   posterior limb of the left internal capsule that is clinically inconsistent with the left arm symptoms and patient's confusion and impaired speech reported.  Chronic headaches: Patient reporting history of sinus disease with surgically repaired resulting in resolution of headaches.  Symptoms however did rationally return now with more persistent headache with pressure behind the eyes reported and predominantly facial pain consistent with sinus headache and possibly tension headache as well.  Hiatal hernia and GERD: Patient has longstanding history of hiatal hernia and chronic acid reflux.  She does have a history of a hiatal hernia.  She did become acutely anxious with flushing, nausea, and confusion suggesting possible anxiety attack for possible autonomic event.  Patient denies history of anxiety.  Symptoms apparently resolved after about 10 minutes.  She did feel that there may have been the cardiac etiology for his symptoms but her EKG was normal.  The patient was pending a cardiology evaluation in the outpatient setting.  Will therefore proceed echocardiogram for evaluation of any potential cardiac pathology.      ***      Principal Problem:    Acute CVA (cerebrovascular accident) (HCC)  Resolved Problems:    * No resolved hospital problems. *    Recommendations:                                            Continue current medication management.  Echocardiogram for completion of stroke workup  Outpatient follow-up with PCP and cardiology as previously arranged.  No recommendation of further neurologic workup at this time.  Case was discussed with Neurology NP for coordination of care..  Plan of care and all questions and concerns of patient /family were discussed the bedside.       It was my pleasure to evaluate Carey UBALDO Yared today.  Please call with questions.      Electronically signed by Aroldo Hill MD on 6/16/2025 at 10:36 PM

## 2025-06-17 NOTE — CARE COORDINATION
Transition of care planning. Chart reviewed. Tx from [place] for  aphasia, difficulty walking, and left arm paresthesia lasting approximately 10 minutes  MRI negative for acute infarct  neurology consult echo PT 24  OT 24 Met with patient to explain cm role/transition of care. She lives in 1 story home  with her . Her pcp is Dr Napoles and she uses [facility] on [address]. She WAS independent PTA  No hhc or snf history. Her plan is home and hr  will transport.Electronically signed by Ivana Lundberg RN on 6/17/2025 at 10:27 AM

## 2025-06-17 NOTE — PLAN OF CARE
Problem: Chronic Conditions and Co-morbidities  Goal: Patient's chronic conditions and co-morbidity symptoms are monitored and maintained or improved  Outcome: Adequate for Discharge     Problem: Discharge Planning  Goal: Discharge to home or other facility with appropriate resources  Outcome: Adequate for Discharge     Problem: Pain  Goal: Verbalizes/displays adequate comfort level or baseline comfort level  Outcome: Adequate for Discharge

## 2025-06-17 NOTE — DISCHARGE SUMMARY
[facility]   Discharge summary   Patient ID:  Carey Vail  [id number]  65 y.o.  1960    Admit date: 6/16/2025    Discharge date and time: 6/17/2025    Patient admitted less than 48 hours.  Please see H&P   Patient is medically stable for discharge to home  Referral placed for cardiac evaluation with stress test as well as rheumatological evaluation for autoimmune condition      Signed:  Kay López MD  6/17/2025  2:58 PM

## 2025-06-18 ENCOUNTER — TELEPHONE (OUTPATIENT)
Dept: FAMILY MEDICINE CLINIC | Age: 65
End: 2025-06-18

## 2025-06-18 NOTE — TELEPHONE ENCOUNTER
Pt called needing after hospital follow up appointment. Went to ER on Saturday diagnosed there with TIA. When would you like me to put her on schedule? She is available anytime except Friday because  has appointment at [facility] at 1:15

## 2025-06-18 NOTE — TELEPHONE ENCOUNTER
Care Transitions Initial Follow Up Call    Outreach made within 2 business days of discharge: Yes    Patient: Carey Vail Patient : 1960   MRN: [id number]  Reason for Admission: TIA  Discharge Date: 25       Spoke with: Patient    Discharge department/facility: [facility] Patient Contact:  Was patient able to fill all prescriptions: Yes  Was patient instructed to bring all medications to the follow-up visit: Yes  Is patient taking all medications as directed in the discharge summary? Yes  Does patient understand their discharge instructions: Yes  Does patient have questions or concerns that need addressed prior to 7-14 day follow up office visit: no    Additional needs identified to be addressed with provider  No needs identified             Scheduled appointment with PCP within 7-14 days    Follow Up  Future Appointments   Date Time Provider Department Center   2025  9:15 AM Neema Napoles MD [facility] DEP   2025  7:15 AM [facility]   2025  3:50 PM aKtie Payton DO [facility]   2025  1:00 PM Neema Napoles MD [facility] DEP   2026  9:00 AM Sola Cedeño APRN - CNP [facility]       Myriam Walden LPN

## 2025-06-19 ENCOUNTER — OFFICE VISIT (OUTPATIENT)
Dept: FAMILY MEDICINE CLINIC | Age: 65
End: 2025-06-19
Payer: OTHER GOVERNMENT

## 2025-06-19 VITALS
RESPIRATION RATE: 14 BRPM | OXYGEN SATURATION: 97 % | BODY MASS INDEX: 28.85 KG/M2 | DIASTOLIC BLOOD PRESSURE: 74 MMHG | HEIGHT: 62 IN | HEART RATE: 76 BPM | SYSTOLIC BLOOD PRESSURE: 138 MMHG | WEIGHT: 156.8 LBS | TEMPERATURE: 98.5 F

## 2025-06-19 DIAGNOSIS — L92.0 GRANULOMA ANNULARE: Primary | ICD-10-CM

## 2025-06-19 DIAGNOSIS — G45.9 TIA (TRANSIENT ISCHEMIC ATTACK): ICD-10-CM

## 2025-06-19 PROCEDURE — 99214 OFFICE O/P EST MOD 30 MIN: CPT | Performed by: FAMILY MEDICINE

## 2025-06-19 RX ORDER — CLOPIDOGREL BISULFATE 75 MG/1
75 TABLET ORAL DAILY
Qty: 90 TABLET | Refills: 0 | Status: SHIPPED | OUTPATIENT
Start: 2025-06-19

## 2025-06-19 RX ORDER — ATORVASTATIN CALCIUM 40 MG/1
40 TABLET, FILM COATED ORAL NIGHTLY
Qty: 90 TABLET | Refills: 3 | Status: SHIPPED | OUTPATIENT
Start: 2025-06-19

## 2025-06-19 NOTE — PROGRESS NOTES
Rheumatology, [place]  2. TIA (transient ischemic attack)  -     Shu Sandra MD, Rheumatology, [place]    No follow-ups on file.         Subjective   History of Present Illness  The patient presents for evaluation following a transient ischemic attack (TIA), gastroesophageal reflux disease (GERD), and an autoimmune disorder.    She experienced a sudden onset of symptoms suggestive of a TIA on Saturday afternoon. These symptoms included a sensation of panic, heat, nausea, and difficulty articulating her thoughts. Her  noted incoherent speech and abnormal gait, prompting him to call emergency services. By the time the fire department arrived, her speech had improved, but she exhibited memory lapses, such as forgetting her medication allergies and social security number. An EKG performed by the paramedics was normal, ruling out a cardiac event. She was transported to [place] where she spent 2 days in the ER and underwent extensive testing. She was subsequently transferred to [place] where she was seen by a neurology nurse practitioner who informed her of a brain cyst unrelated to the TIA. She was also seen by Dr. Barajas who provided her with most of the information. She reports feeling overwhelmed and fatigued and has been experiencing memory issues, such as forgetting the name of a cracker. She reports no leg weakness or speech difficulties but is concerned about potential activity restrictions. She has a stress test scheduled for 07/02/2025. She was advised to continue aspirin therapy indefinitely and blood thinners for 3 months.    She has been experiencing neck, shoulder, and back pain, which she attributes to recent outdoor work. The pain has been radiating down her side. She has been prescribed muscle relaxers for this pain but discontinued them upon returning home. She is considering discontinuing hydroxychloroquine due to perceived ineffectiveness and exacerbation of her acid reflux. She

## 2025-06-22 NOTE — PROGRESS NOTES
Physician Progress Note      PATIENT:               ISAC FERNANDO  [facility] #:                  [id number]  :                       1960  ADMIT DATE:       2025 9:40 AM  DISCH DATE:        2025 5:07 PM  RESPONDING  PROVIDER #:        Kay López MD          QUERY TEXT:    TIA is documented in the medical record Neuro PN  by Barbara Godinez,   WILLI - CNP.  Please clarify the cause of aphasia, difficulty walking, and left   arm paresthesia:    The clinical indicators include:  Neuro PN  by Barbara Godinez, APRN - CNP states:  -\"TIA  Patient with aphasia, difficulty walking, and left arm paresthesia lasting   approximately 10 minutes\"  -\"patient said she was sitting down and felt like she was having a panic   attack.  She developed nausea, began to sweat and then began experiencing   aphasia and difficulty walking.\"    CT head was negative and her CTAs were suggestive for  Low-attenuation in the left internal capsule suspicious for a lacunar infarct.    MRI negative for acute infarct    Treatment:  Start aspirin Plavix x 21 days then aspirin monotherapy  Start statin with an LDL goal <70, currently 140  Echocardiogram  Stroke education  Follow-up with neurology after discharge  Options provided:  -- TIA symptoms related to Panic attack, TIA ruled out after study  -- TIA symptoms related to choroid plexus cysts, TIA ruled out after study  -- Presenting symptoms are related to TIA with unknown etiology  -- Other - I will add my own diagnosis  -- Disagree - Not applicable / Not valid  -- Disagree - Clinically unable to determine / Unknown  -- Refer to Clinical Documentation Reviewer    PROVIDER RESPONSE TEXT:    TIA symptoms are related to Panic attack, TIA ruled out after study.    Query created by: Nelia Lorenzo on 2025 3:42 PM      Electronically signed by:  Kay López MD 2025 11:00 AM

## 2025-06-23 ENCOUNTER — TELEPHONE (OUTPATIENT)
Dept: FAMILY MEDICINE CLINIC | Age: 65
End: 2025-06-23

## 2025-06-23 DIAGNOSIS — M25.50 ARTHRALGIA, UNSPECIFIED JOINT: Primary | ICD-10-CM

## 2025-06-23 NOTE — TELEPHONE ENCOUNTER
Carey called and said Dr Black's office called and said that they need a new referral sent over with a new diagnosis code of joint pain in multiple sites

## 2025-06-25 ENCOUNTER — OFFICE VISIT (OUTPATIENT)
Dept: RHEUMATOLOGY | Age: 65
End: 2025-06-25
Payer: OTHER GOVERNMENT

## 2025-06-25 VITALS
HEART RATE: 87 BPM | SYSTOLIC BLOOD PRESSURE: 129 MMHG | BODY MASS INDEX: 28.16 KG/M2 | HEIGHT: 62 IN | WEIGHT: 153 LBS | DIASTOLIC BLOOD PRESSURE: 86 MMHG | OXYGEN SATURATION: 96 %

## 2025-06-25 DIAGNOSIS — M15.9 POLYARTICULAR OSTEOARTHRITIS: ICD-10-CM

## 2025-06-25 DIAGNOSIS — M85.89 OSTEOPENIA OF MULTIPLE SITES: Primary | ICD-10-CM

## 2025-06-25 DIAGNOSIS — L92.0 GRANULOMA ANNULARE: ICD-10-CM

## 2025-06-25 PROCEDURE — 99204 OFFICE O/P NEW MOD 45 MIN: CPT | Performed by: STUDENT IN AN ORGANIZED HEALTH CARE EDUCATION/TRAINING PROGRAM

## 2025-06-25 PROCEDURE — G2211 COMPLEX E/M VISIT ADD ON: HCPCS | Performed by: STUDENT IN AN ORGANIZED HEALTH CARE EDUCATION/TRAINING PROGRAM

## 2025-06-25 NOTE — PROGRESS NOTES
Carey is here today as a new patient and she was placed on HCQ from her Dermatologist one year ago and states she stopped the mediation as it created acid reflux and it did not give relief.  Currently she has symptoms of pain to her legs and knees and has difficulty walking up and down stairs.  She also states she has Osteopenia and was formerly on Prolia and says her insurance will not cover if it's not Osteoporosis.    
appearance: Pleasant, cooperative, in no acute distress  Eyes: Conjunctiva clear, PERRL  HENT: External ears and nose normal  Respiratory: Normal effort and rate; CTAB; no wheezes, rales, or rhonchi  Cardiovascular: RRR; no murmurs, rubs, or gallops; no LE edema  Skin: GA w/mild erythema and  hyperpigmentation most notable on the b/l LE  Neurologic: No focal deficit  Musculoskeletal:  Spine: Normal posture, no tenderness to percussion  Upper Extremities  Elbows: No deformity, swelling, effusion, warmth, or tenderness; normal ROM  Wrists: No deformity, swelling, effusion, warmth, or tenderness; normal ROM  Hands: No deformity; no synovitis, warmth, or tenderness of the MCPs, PIPs, or DIPs  Lower Extremities  Knees: No deformity, swelling, effusion, warmth, or tenderness; normal ROM  Ankles: No deformity, swelling, effusion, warmth, or tenderness; normal ROM         Lab Results   Component Value Date    WBC 4.1 (L) 06/17/2025    HGB 13.8 06/17/2025    HCT 40.2 06/17/2025    MCV 91.2 06/17/2025     06/17/2025    LYMPHOPCT 61 (H) 06/17/2025    RBC 4.41 06/17/2025    MCH 31.3 06/17/2025    MCHC 34.3 06/17/2025    RDW 12.1 06/17/2025     Lab Results   Component Value Date     06/17/2025    K 3.9 06/17/2025     06/17/2025    CO2 24 06/17/2025    BUN 11 06/17/2025    CREATININE 0.7 06/17/2025    GLUCOSE 100 (H) 06/17/2025    CALCIUM 8.9 06/17/2025    BILITOT 0.2 06/14/2025    ALKPHOS 80 06/14/2025    AST 25 06/14/2025    ALT 23 06/14/2025    LABGLOM >90 06/17/2025    GFRAA >60 08/11/2022     Lab Results   Component Value Date    SANDY NEGATIVE 02/28/2025     No components found for: \"RHEUMFACTOR\"  Lab Results   Component Value Date    SEDRATE 2 02/28/2025     Lab Results   Component Value Date    CRP <3.0 02/28/2025        Assessment & Plan   ASSESSMENT/PLAN:    1. Osteopenia of multiple sites  2. Granuloma annulare  3. Polyarticular osteoarthritis      Carey UBALDO Vail 1960 is a pleasant 65 y.o. female

## 2025-06-30 ENCOUNTER — TELEPHONE (OUTPATIENT)
Dept: CARDIOLOGY | Age: 65
End: 2025-06-30

## 2025-06-30 NOTE — TELEPHONE ENCOUNTER
Spoke with patient and confirmed regular stress test appointment on July 2, 2025 at 0715. Instructions for test, and COVID-19 preprocedure information reviewed with patient, questions answered. Patient verbalized understanding. Also instructed to call office if unable to keep appointment.

## 2025-07-01 ENCOUNTER — TELEPHONE (OUTPATIENT)
Dept: ADMINISTRATIVE | Age: 65
End: 2025-07-01

## 2025-07-02 ENCOUNTER — HOSPITAL ENCOUNTER (OUTPATIENT)
Dept: CARDIOLOGY | Age: 65
Discharge: HOME | End: 2025-07-04
Attending: INTERNAL MEDICINE
Payer: OTHER GOVERNMENT

## 2025-07-02 VITALS
SYSTOLIC BLOOD PRESSURE: 124 MMHG | WEIGHT: 156 LBS | DIASTOLIC BLOOD PRESSURE: 80 MMHG | RESPIRATION RATE: 12 BRPM | HEIGHT: 62 IN | BODY MASS INDEX: 28.71 KG/M2 | HEART RATE: 70 BPM

## 2025-07-02 DIAGNOSIS — Z51.89 ENCOUNTER FOR CARDIAC REHABILITATION: ICD-10-CM

## 2025-07-02 LAB
ECHO BSA: 1.76 M2
STRESS ANGINA INDEX: 0
STRESS BASELINE DIAS BP: 80 MMHG
STRESS BASELINE HR: 68 BPM
STRESS BASELINE SYS BP: 124 MMHG
STRESS ESTIMATED WORKLOAD: 6.9 METS
STRESS EXERCISE DUR MIN: 6 MIN
STRESS EXERCISE DUR SEC: 0 SEC
STRESS PEAK DIAS BP: 76 MMHG
STRESS PEAK SYS BP: 138 MMHG
STRESS PERCENT HR ACHIEVED: 87 %
STRESS POST PEAK HR: 137 BPM
STRESS RATE PRESSURE PRODUCT: NORMAL BPM*MMHG
STRESS SR DUKE TREADMILL SCORE: 6
STRESS ST DEPRESSION: 0 MM
STRESS TARGET HR: 157 BPM

## 2025-07-02 PROCEDURE — 93018 CV STRESS TEST I&R ONLY: CPT | Performed by: INTERNAL MEDICINE

## 2025-07-02 PROCEDURE — 93017 CV STRESS TEST TRACING ONLY: CPT

## 2025-07-02 PROCEDURE — 93016 CV STRESS TEST SUPVJ ONLY: CPT | Performed by: INTERNAL MEDICINE

## 2025-07-07 ENCOUNTER — TELEPHONE (OUTPATIENT)
Dept: FAMILY MEDICINE CLINIC | Age: 65
End: 2025-07-07

## 2025-07-07 DIAGNOSIS — I63.9 ACUTE CVA (CEREBROVASCULAR ACCIDENT) (HCC): ICD-10-CM

## 2025-07-07 DIAGNOSIS — G45.9 TIA (TRANSIENT ISCHEMIC ATTACK): Primary | ICD-10-CM

## 2025-07-31 ENCOUNTER — OFFICE VISIT (OUTPATIENT)
Dept: CARDIOLOGY CLINIC | Age: 65
End: 2025-07-31
Payer: OTHER GOVERNMENT

## 2025-07-31 VITALS
SYSTOLIC BLOOD PRESSURE: 132 MMHG | BODY MASS INDEX: 28.52 KG/M2 | DIASTOLIC BLOOD PRESSURE: 94 MMHG | HEART RATE: 74 BPM | RESPIRATION RATE: 16 BRPM | HEIGHT: 62 IN | WEIGHT: 155 LBS

## 2025-07-31 DIAGNOSIS — G45.9 TIA (TRANSIENT ISCHEMIC ATTACK): Primary | ICD-10-CM

## 2025-07-31 PROCEDURE — 99204 OFFICE O/P NEW MOD 45 MIN: CPT | Performed by: INTERNAL MEDICINE

## 2025-07-31 PROCEDURE — 93000 ELECTROCARDIOGRAM COMPLETE: CPT | Performed by: INTERNAL MEDICINE

## 2025-07-31 NOTE — PROGRESS NOTES
Carey Vail  1960  Date of Service: 7/31/2025    Reason for Consultation:    We were asked to see Carey Vail by Dr. Napoles, Neema CONNER MD  regarding evaluation for cardiac source of TIA.    History of Chief Complaint:   This is a 65 y.o. female with a history of hypercholesterolemia and GERD/hiatal hernia.  She was hospitalized last month with strokelike symptoms and felt to have had a TIA.  She states that she has had chronic intermittent episodes of chest tightness for years.  They occur a few times per month and lasts for few seconds.  They occur only at rest.  She typically is very active walking 3 miles before her TIA.  This did not cause any of the chest discomfort or dyspnea.  She did not exercise or walk for a while after her TIA.  She has started walking again but only walking 1-1/2 miles.  She states that now after this she is more winded than she used to be.  She denies any orthopnea/PND.  She denies any palpitations, or syncope, or near syncope.    REVIEW OF SYSTEMS:  As above.  See patient questionair for further review of systems.     CURRENT MEDICATIONS:  Current Outpatient Medications   Medication Sig Dispense Refill    clopidogrel (PLAVIX) 75 MG tablet Take 1 tablet by mouth daily 90 tablet 0    atorvastatin (LIPITOR) 40 MG tablet Take 1 tablet by mouth nightly 90 tablet 3    aspirin 81 MG chewable tablet Take 1 tablet by mouth daily 30 tablet 3    Psyllium (METAMUCIL 3 IN 1 DAILY FIBER PO) Take 3 capsules by mouth nightly      Fexofenadine HCl (ALLEGRA PO) Take 1 tablet by mouth as needed      azelastine (ASTELIN) 0.1 % nasal spray 2 sprays by Nasal route 2 times daily Use in each nostril as directed 120 mL 3    omeprazole (PRILOSEC) 40 MG delayed release capsule Take 1 capsule by mouth daily as needed (acid reflux) 90 capsule 3    ibuprofen (ADVIL;MOTRIN) 600 MG tablet Take 1 tablet by mouth every 6 hours as needed for Pain      BIOTIN PO Take 1 tablet by mouth daily      calcium

## 2025-07-31 NOTE — PROGRESS NOTES
Patient was seen today and a 28 day monitor was placed. Monitor was ordered by Dr. Ortega. The monitor was applied, instructions were given to the patient. Patient stated understanding and gave verbalize readback.   Monitor company: [id number]  Serial number: [id number]

## 2025-08-26 PROBLEM — Z86.73 HISTORY OF STROKE: Status: ACTIVE | Noted: 2025-06-15

## 2025-08-27 ENCOUNTER — TELEPHONE (OUTPATIENT)
Dept: CARDIOLOGY CLINIC | Age: 65
End: 2025-08-27

## 2025-08-27 DIAGNOSIS — Z00.00 ANNUAL PHYSICAL EXAM: Primary | ICD-10-CM

## 2025-08-27 DIAGNOSIS — Z00.00 ANNUAL PHYSICAL EXAM: ICD-10-CM

## 2025-08-27 LAB
ALBUMIN: 4.5 G/DL (ref 3.5–5.2)
ALP BLD-CCNC: 83 U/L (ref 35–104)
ALT SERPL-CCNC: 21 U/L (ref 0–35)
ANION GAP SERPL CALCULATED.3IONS-SCNC: 13 MMOL/L (ref 7–16)
AST SERPL-CCNC: 27 U/L (ref 0–35)
BASOPHILS ABSOLUTE: 0.03 K/UL (ref 0–0.2)
BASOPHILS RELATIVE PERCENT: 1 % (ref 0–2)
BILIRUB SERPL-MCNC: 0.5 MG/DL (ref 0–1.2)
BUN BLDV-MCNC: 9 MG/DL (ref 8–23)
CALCIUM SERPL-MCNC: 9.7 MG/DL (ref 8.8–10.2)
CHLORIDE BLD-SCNC: 106 MMOL/L (ref 98–107)
CHOLESTEROL, TOTAL: 149 MG/DL
CO2: 25 MMOL/L (ref 22–29)
CREAT SERPL-MCNC: 0.7 MG/DL (ref 0.5–1)
EOSINOPHILS ABSOLUTE: 0.1 K/UL (ref 0.05–0.5)
EOSINOPHILS RELATIVE PERCENT: 3 % (ref 0–6)
GFR, ESTIMATED: >90 ML/MIN/1.73M2
GLUCOSE BLD-MCNC: 100 MG/DL (ref 74–99)
HBA1C MFR BLD: 5.6 % (ref 4–5.6)
HCT VFR BLD CALC: 44.4 % (ref 34–48)
HDLC SERPL-MCNC: 96 MG/DL
HEMOGLOBIN: 14.6 G/DL (ref 11.5–15.5)
IMMATURE GRANULOCYTES %: 0 % (ref 0–5)
IMMATURE GRANULOCYTES ABSOLUTE: <0.03 K/UL (ref 0–0.58)
LDL CHOLESTEROL: 46 MG/DL
LYMPHOCYTES ABSOLUTE: 1.59 K/UL (ref 1.5–4)
LYMPHOCYTES RELATIVE PERCENT: 45 % (ref 20–42)
MCH RBC QN AUTO: 31.1 PG (ref 26–35)
MCHC RBC AUTO-ENTMCNC: 32.9 G/DL (ref 32–34.5)
MCV RBC AUTO: 94.7 FL (ref 80–99.9)
MONOCYTES ABSOLUTE: 0.35 K/UL (ref 0.1–0.95)
MONOCYTES RELATIVE PERCENT: 10 % (ref 2–12)
NEUTROPHILS ABSOLUTE: 1.43 K/UL (ref 1.8–7.3)
NEUTROPHILS RELATIVE PERCENT: 41 % (ref 43–80)
PDW BLD-RTO: 12.6 % (ref 11.5–15)
PLATELET # BLD: 228 K/UL (ref 130–450)
PMV BLD AUTO: 10.7 FL (ref 7–12)
POTASSIUM SERPL-SCNC: 4.4 MMOL/L (ref 3.5–5.1)
RBC # BLD: 4.69 M/UL (ref 3.5–5.5)
SODIUM BLD-SCNC: 143 MMOL/L (ref 136–145)
TOTAL PROTEIN: 7.1 G/DL (ref 6.4–8.3)
TRIGL SERPL-MCNC: 38 MG/DL
TSH SERPL DL<=0.05 MIU/L-ACNC: 2.72 UIU/ML (ref 0.27–4.2)
VLDLC SERPL CALC-MCNC: 8 MG/DL
WBC # BLD: 3.5 K/UL (ref 4.5–11.5)

## 2025-09-02 ENCOUNTER — OFFICE VISIT (OUTPATIENT)
Dept: FAMILY MEDICINE CLINIC | Age: 65
End: 2025-09-02
Payer: OTHER GOVERNMENT

## 2025-09-02 VITALS
WEIGHT: 152.8 LBS | DIASTOLIC BLOOD PRESSURE: 76 MMHG | SYSTOLIC BLOOD PRESSURE: 128 MMHG | HEART RATE: 81 BPM | RESPIRATION RATE: 14 BRPM | HEIGHT: 63 IN | TEMPERATURE: 97.8 F | OXYGEN SATURATION: 100 % | BODY MASS INDEX: 27.07 KG/M2

## 2025-09-02 DIAGNOSIS — G45.9 TIA (TRANSIENT ISCHEMIC ATTACK): ICD-10-CM

## 2025-09-02 DIAGNOSIS — E78.2 MIXED HYPERLIPIDEMIA: Primary | ICD-10-CM

## 2025-09-02 DIAGNOSIS — R73.01 IMPAIRED FASTING GLUCOSE: ICD-10-CM

## 2025-09-02 PROCEDURE — 99214 OFFICE O/P EST MOD 30 MIN: CPT | Performed by: FAMILY MEDICINE

## 2025-09-05 ENCOUNTER — TELEPHONE (OUTPATIENT)
Dept: CARDIOLOGY CLINIC | Age: 65
End: 2025-09-05

## (undated) DEVICE — SYRINGE, LUER LOCK, 10ML: Brand: MEDLINE

## (undated) DEVICE — CAMERA STRYKER 1488

## (undated) DEVICE — LIGHT SOURCE WHT

## (undated) DEVICE — COUNTER NDL 30 COUNT DBL MAG

## (undated) DEVICE — SET NASAL

## (undated) DEVICE — TRAY SINUS INST EXTRAS REUSABLE

## (undated) DEVICE — SOLUTION IV IRRIG POUR BRL 0.9% SODIUM CHL 2F7124

## (undated) DEVICE — SURGICAL PROCEDURE PACK EENT CUST

## (undated) DEVICE — NEEDLE SPNL 22GA L3.5IN BLK HUB S STL REG WALL FIT STYL W/

## (undated) DEVICE — TOWEL,OR,DSP,ST,BLUE,STD,6/PK,12PK/CS: Brand: MEDLINE

## (undated) DEVICE — BALLOON SINUPLASTY 6X16 MM SYS RELIEVA SPINPLUS

## (undated) DEVICE — SOLUTION IV 1000ML 0.9% SOD CHL PH 5 INJ USP VIAFLX PLAS

## (undated) DEVICE — REFLEX ULTRA 45 WITH INTEGRATED CABLE: Brand: COBLATION

## (undated) DEVICE — INTENDED FOR TISSUE SEPARATION, AND OTHER PROCEDURES THAT REQUIRE A SHARP SURGICAL BLADE TO PUNCTURE OR CUT.: Brand: BARD-PARKER ® STAINLESS STEEL BLADES

## (undated) DEVICE — INSTRUMENT CORD BALLOON SINUPLASTY REUSABLE

## (undated) DEVICE — CODMAN® SURGICAL PATTIES 1/2" X 3" (1.27CM X 7.62CM): Brand: CODMAN®

## (undated) DEVICE — SPLINT 1524055 DOYLE II AIRWAY SET: Brand: DOYLE II ™

## (undated) DEVICE — GRADUATE TRIANG MEASURE 1000ML BLK PRNT

## (undated) DEVICE — SHEATH 1912010 5PK 4MM/30DEG STORZ XOMED: Brand: ENDO-SCRUB®

## (undated) DEVICE — SYRINGE MED 50ML LUERLOCK TIP

## (undated) DEVICE — KIT,ANTI FOG,W/SPONGE & FLUID,SOFT PACK: Brand: MEDLINE

## (undated) DEVICE — SOLUTION IV IRRIG WATER 1000ML POUR BRL 2F7114

## (undated) DEVICE — SET SINUS SCOPE

## (undated) DEVICE — DOUBLE BASIN SET: Brand: MEDLINE INDUSTRIES, INC.

## (undated) DEVICE — MAGNETIC INSTR DRAPE 20X16: Brand: MEDLINE INDUSTRIES, INC.

## (undated) DEVICE — SPONGE GZ W4XL4IN RAYON POLY FILL CVR W/ NONWOVEN FAB

## (undated) DEVICE — NEEDLE HYPO 25GA L1.5IN BLU POLYPR HUB S STL REG BVL STR

## (undated) DEVICE — 4-PORT MANIFOLD: Brand: NEPTUNE 2

## (undated) DEVICE — GOWN, PREVENTION PLUS, LRG/LONG, STERILE: Brand: MEDLINE

## (undated) DEVICE — SYRINGE IRRIG 60ML SFT PLIABLE BLB EZ TO GRP 1 HND USE W/

## (undated) DEVICE — GLOVE ORANGE PI 8 1/2   MSG9085

## (undated) DEVICE — TUBING 1912030 ENDO-SCRUB 2 5PK: Brand: ENDO-SCRUB®

## (undated) DEVICE — Device

## (undated) DEVICE — SOLUTION IV 500ML 0.9% SOD CHL PH 5 INJ USP VIAFLX PLAS

## (undated) DEVICE — SET SINUS ENDOSCOPY

## (undated) DEVICE — CONTROL SYRINGE LUER-LOCK TIP: Brand: MONOJECT

## (undated) DEVICE — ENTACT SEPTAL STAPLER 3 PACK: Brand: ENT SINUS

## (undated) DEVICE — TUBING, SUCTION, 3/16" X 12', STRAIGHT: Brand: MEDLINE

## (undated) DEVICE — DEVICE INFL PRSS G INDIC DISP (MUST BE PURC IN MULTIPLES OF 5)

## (undated) DEVICE — SPONGE GZ W4XL4IN RAYON POLY CVR W/NONWOVEN FAB STRL 2/PK

## (undated) DEVICE — CABLE NAVIGATION TRUDI

## (undated) DEVICE — MARKER,SKIN,WI/RULER AND LABELS: Brand: MEDLINE

## (undated) DEVICE — NEEDLE FLTR 18GA L1.5IN MEM THK5UM BLNT DISP